# Patient Record
Sex: MALE | Race: BLACK OR AFRICAN AMERICAN | NOT HISPANIC OR LATINO | Employment: PART TIME | URBAN - METROPOLITAN AREA
[De-identification: names, ages, dates, MRNs, and addresses within clinical notes are randomized per-mention and may not be internally consistent; named-entity substitution may affect disease eponyms.]

---

## 2020-09-18 ENCOUNTER — APPOINTMENT (OUTPATIENT)
Dept: URGENT CARE | Age: 35
End: 2020-09-18
Payer: OTHER MISCELLANEOUS

## 2020-09-18 PROCEDURE — 99213 OFFICE O/P EST LOW 20 MIN: CPT | Performed by: PREVENTIVE MEDICINE

## 2021-11-03 ENCOUNTER — APPOINTMENT (EMERGENCY)
Dept: RADIOLOGY | Facility: HOSPITAL | Age: 36
End: 2021-11-03
Payer: COMMERCIAL

## 2021-11-03 ENCOUNTER — HOSPITAL ENCOUNTER (EMERGENCY)
Facility: HOSPITAL | Age: 36
Discharge: HOME | End: 2021-11-03
Attending: EMERGENCY MEDICINE
Payer: COMMERCIAL

## 2021-11-03 VITALS
SYSTOLIC BLOOD PRESSURE: 140 MMHG | RESPIRATION RATE: 18 BRPM | OXYGEN SATURATION: 96 % | DIASTOLIC BLOOD PRESSURE: 93 MMHG | TEMPERATURE: 96.8 F | HEART RATE: 78 BPM | HEIGHT: 75 IN | BODY MASS INDEX: 28.6 KG/M2 | WEIGHT: 230 LBS

## 2021-11-03 DIAGNOSIS — R05.9 COUGH: ICD-10-CM

## 2021-11-03 DIAGNOSIS — J45.31 MILD PERSISTENT ASTHMA WITH EXACERBATION: Primary | ICD-10-CM

## 2021-11-03 LAB
ANION GAP SERPL CALC-SCNC: 11 MEQ/L (ref 3–15)
BASOPHILS # BLD: 0.05 K/UL (ref 0.01–0.1)
BASOPHILS NFR BLD: 0.9 %
BUN SERPL-MCNC: 8 MG/DL (ref 8–20)
CALCIUM SERPL-MCNC: 9.6 MG/DL (ref 8.9–10.3)
CHLORIDE SERPL-SCNC: 109 MEQ/L (ref 98–109)
CO2 SERPL-SCNC: 23 MEQ/L (ref 22–32)
CREAT SERPL-MCNC: 1.2 MG/DL (ref 0.8–1.3)
DIFFERENTIAL METHOD BLD: ABNORMAL
EOSINOPHIL # BLD: 0.58 K/UL (ref 0.04–0.54)
EOSINOPHIL NFR BLD: 10.5 %
ERYTHROCYTE [DISTWIDTH] IN BLOOD BY AUTOMATED COUNT: 12.8 % (ref 11.6–14.4)
FLUAV RNA SPEC QL NAA+PROBE: NEGATIVE
FLUBV RNA SPEC QL NAA+PROBE: NEGATIVE
GFR SERPL CREATININE-BSD FRML MDRD: >60 ML/MIN/1.73M*2
GLUCOSE SERPL-MCNC: 90 MG/DL (ref 70–99)
HCT VFR BLDCO AUTO: 48.1 % (ref 40.1–51)
HGB BLD-MCNC: 15.9 G/DL (ref 13.7–17.5)
IMM GRANULOCYTES # BLD AUTO: 0.01 K/UL (ref 0–0.08)
IMM GRANULOCYTES NFR BLD AUTO: 0.2 %
LYMPHOCYTES # BLD: 2.53 K/UL (ref 1.2–3.5)
LYMPHOCYTES NFR BLD: 45.9 %
MCH RBC QN AUTO: 28.3 PG (ref 28–33.2)
MCHC RBC AUTO-ENTMCNC: 33.1 G/DL (ref 32.2–36.5)
MCV RBC AUTO: 85.6 FL (ref 83–98)
MONOCYTES # BLD: 0.45 K/UL (ref 0.3–1)
MONOCYTES NFR BLD: 8.2 %
NEUTROPHILS # BLD: 1.89 K/UL (ref 1.7–7)
NEUTS SEG NFR BLD: 34.3 %
NRBC BLD-RTO: 0 %
PDW BLD AUTO: 9.3 FL (ref 9.4–12.4)
PLATELET # BLD AUTO: 272 K/UL (ref 150–350)
POTASSIUM SERPL-SCNC: 4.4 MEQ/L (ref 3.6–5.1)
RBC # BLD AUTO: 5.62 M/UL (ref 4.5–5.8)
RSV RNA SPEC QL NAA+PROBE: NEGATIVE
SARS-COV-2 RNA RESP QL NAA+PROBE: NEGATIVE
SODIUM SERPL-SCNC: 143 MEQ/L (ref 136–144)
TROPONIN I SERPL-MCNC: <0.03 NG/ML
WBC # BLD AUTO: 5.51 K/UL (ref 3.8–10.5)

## 2021-11-03 PROCEDURE — 3E0F7GC INTRODUCTION OF OTHER THERAPEUTIC SUBSTANCE INTO RESPIRATORY TRACT, VIA NATURAL OR ARTIFICIAL OPENING: ICD-10-PCS | Performed by: EMERGENCY MEDICINE

## 2021-11-03 PROCEDURE — 99283 EMERGENCY DEPT VISIT LOW MDM: CPT | Mod: 25

## 2021-11-03 PROCEDURE — 85025 COMPLETE CBC W/AUTO DIFF WBC: CPT | Performed by: EMERGENCY MEDICINE

## 2021-11-03 PROCEDURE — 80048 BASIC METABOLIC PNL TOTAL CA: CPT | Performed by: EMERGENCY MEDICINE

## 2021-11-03 PROCEDURE — 63700000 HC SELF-ADMINISTRABLE DRUG: Performed by: EMERGENCY MEDICINE

## 2021-11-03 PROCEDURE — 84484 ASSAY OF TROPONIN QUANT: CPT | Performed by: EMERGENCY MEDICINE

## 2021-11-03 PROCEDURE — 36415 COLL VENOUS BLD VENIPUNCTURE: CPT | Performed by: EMERGENCY MEDICINE

## 2021-11-03 PROCEDURE — 87637 SARSCOV2&INF A&B&RSV AMP PRB: CPT | Performed by: EMERGENCY MEDICINE

## 2021-11-03 PROCEDURE — 25000000 HC PHARMACY GENERAL: Performed by: EMERGENCY MEDICINE

## 2021-11-03 PROCEDURE — 93005 ELECTROCARDIOGRAM TRACING: CPT

## 2021-11-03 PROCEDURE — 93005 ELECTROCARDIOGRAM TRACING: CPT | Performed by: EMERGENCY MEDICINE

## 2021-11-03 PROCEDURE — 71046 X-RAY EXAM CHEST 2 VIEWS: CPT

## 2021-11-03 RX ORDER — ALBUTEROL SULFATE 90 UG/1
2 INHALANT RESPIRATORY (INHALATION) EVERY 4 HOURS PRN
Qty: 1 EACH | Refills: 0 | Status: SHIPPED | OUTPATIENT
Start: 2021-11-03 | End: 2022-08-30 | Stop reason: SDUPTHER

## 2021-11-03 RX ORDER — PREDNISONE 20 MG/1
60 TABLET ORAL ONCE
Status: COMPLETED | OUTPATIENT
Start: 2021-11-03 | End: 2021-11-03

## 2021-11-03 RX ORDER — PREDNISONE 50 MG/1
50 TABLET ORAL DAILY
Qty: 5 TABLET | Refills: 0 | Status: SHIPPED | OUTPATIENT
Start: 2021-11-03 | End: 2021-11-08

## 2021-11-03 RX ORDER — ALBUTEROL SULFATE 0.83 MG/ML
2.5 SOLUTION RESPIRATORY (INHALATION) ONCE
Status: COMPLETED | OUTPATIENT
Start: 2021-11-03 | End: 2021-11-03

## 2021-11-03 RX ADMIN — PREDNISONE 60 MG: 20 TABLET ORAL at 20:52

## 2021-11-03 RX ADMIN — ALBUTEROL SULFATE 2.5 MG: 2.5 SOLUTION RESPIRATORY (INHALATION) at 20:51

## 2021-11-03 ASSESSMENT — ENCOUNTER SYMPTOMS
FEVER: 0
BACK PAIN: 0
SYNCOPE: 0
VOMITING: 0
NECK PAIN: 0
COUGH: 1
ABDOMINAL PAIN: 0
HEADACHES: 0
SORE THROAT: 0
DIAPHORESIS: 0
WHEEZING: 1
SHORTNESS OF BREATH: 1

## 2021-11-03 NOTE — ED PROVIDER NOTES
Emergency Medicine Note  HPI   HISTORY OF PRESENT ILLNESS       History provided by:  Patient  Shortness of Breath  Severity:  Moderate  Onset quality:  Gradual  Duration:  5 days  Timing:  Constant  Progression:  Waxing and waning  Chronicity:  New  Context comment:  Has had cough for the past 3 days or so.  Has some sinus drainage.  Relieved by:  Nothing  Worsened by:  Activity and coughing (has a new cat - possible allergy related)  Ineffective treatments:  Inhaler  Associated symptoms: cough and wheezing    Associated symptoms: no abdominal pain, no diaphoresis, no fever, no headaches, no neck pain, no rash, no sore throat, no syncope and no vomiting    Associated symptoms comment:  Chest tightness with wheezing          Patient History   PAST HISTORY     Reviewed from Nursing Triage:      History reviewed. No pertinent past medical history.    History reviewed. No pertinent surgical history.    History reviewed. No pertinent family history.    Social History     Tobacco Use   • Smoking status: Never Smoker   • Smokeless tobacco: Never Used   Substance Use Topics   • Alcohol use: Not on file   • Drug use: Not on file         Review of Systems   REVIEW OF SYSTEMS     Review of Systems   Constitutional: Negative for diaphoresis and fever.   HENT: Negative for sore throat.         Nasal congestion     Respiratory: Positive for cough, shortness of breath and wheezing.    Cardiovascular: Negative for leg swelling and syncope.   Gastrointestinal: Negative for abdominal pain and vomiting.   Musculoskeletal: Negative for back pain and neck pain.   Skin: Negative for rash.   Neurological: Negative for headaches.   All other systems reviewed and are negative.        VITALS     ED Vitals    Date/Time Temp Pulse Resp BP SpO2 [facility]   11/03/21 1845 -- 81 18 138/89 95 % [facility]   11/03/21 1749 -- 81 18 140/91 95 % [facility]   11/03/21 1612 36 °C (96.8 °F) 82 18 150/78 95 % [facility]        Pulse Ox %: 95 % (11/03/21 1906)  Pulse Ox  Interpretation: Normal (11/03/21 1906)  Heart Rate: 82 (11/03/21 1906)  Rhythm Strip Interpretation: Normal Sinus Rhythm (11/03/21 1906)     Physical Exam   PHYSICAL EXAM     Physical Exam  Vitals and nursing note reviewed.   Constitutional:       General: He is not in acute distress.     Appearance: Normal appearance. He is not ill-appearing.   HENT:      Head: Normocephalic and atraumatic.   Eyes:      Conjunctiva/sclera: Conjunctivae normal.      Pupils: Pupils are equal, round, and reactive to light.   Cardiovascular:      Rate and Rhythm: Normal rate and regular rhythm.      Pulses: Normal pulses.      Heart sounds: Normal heart sounds. No murmur heard.      Pulmonary:      Effort: Pulmonary effort is normal.      Breath sounds: Wheezing (  mild expiratory bilaterally) present.   Abdominal:      Palpations: Abdomen is soft.      Tenderness: There is no abdominal tenderness.   Musculoskeletal:         General: No tenderness.      Cervical back: Neck supple.      Right lower leg: No edema.      Left lower leg: No edema.   Skin:     General: Skin is warm and dry.      Coloration: Skin is not pale.      Findings: No erythema or rash.   Neurological:      Mental Status: He is alert and oriented to person, place, and time.   Psychiatric:         Mood and Affect: Mood normal.           PROCEDURES     Procedures     DATA     Results     None          Imaging Results          X-RAY CHEST 2 VIEWS (Final result)  Result time 11/03/21 17:36:33    Final result                 Impression:    IMPRESSION:  No dense effusion or dense consolidation.             Narrative:    CLINICAL HISTORY:   Shortness of breath.    COMMENT:  Technique: Frontal and lateral views of the chest were obtained.    Comparison: None.    The lungs are free of dense effusion or dense consolidation. The  cardiomediastinal silhouette is within normal limits. The osseous structures are  within normal limits.                                ECG 12 lead              Scoring tools                                 ED Course & MDM   MDM / ED COURSE and CLINICAL IMPRESSIONS     MDM    ED Course as of 11/03/21 2308 Wed Nov 03, 2021   1905 EKG NSR 79bpm nonspecific t changes, normal qrs/axis/st.  No STEMI.   [MURPHY]   1925 Patient had covid in April 2021 and was vaccinated over the summer.  Has hx asthma and uses inhaler PRN.  No sick contacts.  Patient states he has a very abnormal baseline EKG and they always keep him in the hospital whenever they check an ekg.  States had a negative stress test in 2018.   [MURPHY]      ED Course User Index  [MURPHY] Venkat Cantu MD         Clinical Impressions as of 11/03/21 2308   Mild persistent asthma with exacerbation   Cough            Venkat Cantu MD  11/03/21 2310

## 2021-11-04 LAB
ATRIAL RATE: 79
P AXIS: 54
PR INTERVAL: 178
QRS DURATION: 78
QT INTERVAL: 340
QTC CALCULATION(BAZETT): 389
R AXIS: 57
T WAVE AXIS: 16
VENTRICULAR RATE: 79

## 2021-11-04 PROCEDURE — 93010 ELECTROCARDIOGRAM REPORT: CPT | Performed by: INTERNAL MEDICINE

## 2021-11-04 NOTE — DISCHARGE INSTRUCTIONS
Call to follow-up with a primary care physician as we discussed.  Use your inhaler as directed for wheezing.  Take your next dose of the prednisone after dinner tomorrow.  Return to the ER for concerning symptoms.

## 2022-02-04 ENCOUNTER — HOSPITAL ENCOUNTER (EMERGENCY)
Facility: HOSPITAL | Age: 37
Discharge: HOME | End: 2022-02-04
Attending: EMERGENCY MEDICINE
Payer: COMMERCIAL

## 2022-02-04 ENCOUNTER — APPOINTMENT (EMERGENCY)
Dept: RADIOLOGY | Facility: HOSPITAL | Age: 37
End: 2022-02-04
Payer: COMMERCIAL

## 2022-02-04 VITALS
RESPIRATION RATE: 16 BRPM | OXYGEN SATURATION: 96 % | WEIGHT: 242 LBS | SYSTOLIC BLOOD PRESSURE: 138 MMHG | HEIGHT: 75 IN | DIASTOLIC BLOOD PRESSURE: 88 MMHG | BODY MASS INDEX: 30.09 KG/M2 | HEART RATE: 86 BPM | TEMPERATURE: 98.1 F

## 2022-02-04 DIAGNOSIS — J06.9 URI, ACUTE: Primary | ICD-10-CM

## 2022-02-04 DIAGNOSIS — J98.01 BRONCHOSPASM: ICD-10-CM

## 2022-02-04 LAB
FLUAV RNA SPEC QL NAA+PROBE: NEGATIVE
FLUBV RNA SPEC QL NAA+PROBE: NEGATIVE
RSV RNA SPEC QL NAA+PROBE: NEGATIVE
SARS-COV-2 RNA RESP QL NAA+PROBE: NEGATIVE

## 2022-02-04 PROCEDURE — 63700000 HC SELF-ADMINISTRABLE DRUG: Performed by: EMERGENCY MEDICINE

## 2022-02-04 PROCEDURE — 71046 X-RAY EXAM CHEST 2 VIEWS: CPT

## 2022-02-04 PROCEDURE — 25000000 HC PHARMACY GENERAL: Performed by: EMERGENCY MEDICINE

## 2022-02-04 PROCEDURE — 99283 EMERGENCY DEPT VISIT LOW MDM: CPT | Mod: 25

## 2022-02-04 PROCEDURE — 87637 SARSCOV2&INF A&B&RSV AMP PRB: CPT | Performed by: EMERGENCY MEDICINE

## 2022-02-04 RX ORDER — ALBUTEROL SULFATE 90 UG/1
2 INHALANT RESPIRATORY (INHALATION) EVERY 4 HOURS PRN
Qty: 1 EACH | Refills: 0 | Status: SHIPPED | OUTPATIENT
Start: 2022-02-04 | End: 2022-08-30 | Stop reason: SDUPTHER

## 2022-02-04 RX ORDER — IPRATROPIUM BROMIDE AND ALBUTEROL SULFATE 2.5; .5 MG/3ML; MG/3ML
3 SOLUTION RESPIRATORY (INHALATION) ONCE
Status: COMPLETED | OUTPATIENT
Start: 2022-02-04 | End: 2022-02-04

## 2022-02-04 RX ORDER — BENZONATATE 100 MG/1
100 CAPSULE ORAL
Qty: 21 CAPSULE | Refills: 0 | Status: SHIPPED | OUTPATIENT
Start: 2022-02-04 | End: 2022-02-14

## 2022-02-04 RX ORDER — PREDNISONE 50 MG/1
50 TABLET ORAL DAILY
Qty: 5 TABLET | Refills: 0 | Status: SHIPPED | OUTPATIENT
Start: 2022-02-04 | End: 2022-02-09

## 2022-02-04 RX ORDER — PREDNISONE 20 MG/1
60 TABLET ORAL ONCE
Status: COMPLETED | OUTPATIENT
Start: 2022-02-04 | End: 2022-02-04

## 2022-02-04 RX ADMIN — IPRATROPIUM BROMIDE AND ALBUTEROL SULFATE 3 ML: .5; 3 SOLUTION RESPIRATORY (INHALATION) at 16:40

## 2022-02-04 RX ADMIN — PREDNISONE 60 MG: 20 TABLET ORAL at 16:40

## 2022-02-04 ASSESSMENT — ENCOUNTER SYMPTOMS
WEAKNESS: 0
FEVER: 0
VOMITING: 0
COUGH: 1
WOUND: 0
HEADACHES: 0
SORE THROAT: 1
FLANK PAIN: 0
BACK PAIN: 0

## 2022-02-04 NOTE — ED PROVIDER NOTES
Emergency Medicine Note  HPI   HISTORY OF PRESENT ILLNESS     Patient is a 36-year-old male history of asthma as a child, COVID-19 infection of April 2021, vaccinated, presents with 1 month of a cough and intermittent wheezing.  He states during this time he did have a negative Covid test.  His cough is worse at night and is productive of some yellow, and sometimes clear sputum.  He has had sneezing, congestion and feels out of breath.  He uses albuterol inhaler and gets relief.  Symptoms have been persistent.  He states he has had similar episodes of wheezing since his COVID-19 infection of April 2021.      History provided by:  Patient  URI  Presenting symptoms: congestion, cough and sore throat (At first but resolved)    Presenting symptoms: no fever    Severity:  Severe  Duration:  1 month  Timing:  Intermittent  Progression:  Waxing and waning  Chronicity:  Recurrent  Relieved by:  Nothing  Worsened by:  Nothing  Associated symptoms: sneezing    Associated symptoms: no headaches    Chest Pain  Associated symptoms: cough    Associated symptoms: no back pain, no fever, no headache, no vomiting and no weakness          Patient History   PAST HISTORY     Reviewed from Nursing Triage:       No past medical history on file.    No past surgical history on file.    No family history on file.    Social History     Tobacco Use   • Smoking status: Never Smoker   • Smokeless tobacco: Never Used   Substance Use Topics   • Alcohol use: Not on file   • Drug use: Not on file         Review of Systems   REVIEW OF SYSTEMS     Review of Systems   Constitutional: Negative for fever.   HENT: Positive for congestion, sneezing and sore throat (At first but resolved).    Respiratory: Positive for cough.    Cardiovascular: Positive for chest pain.   Gastrointestinal: Negative for vomiting.   Genitourinary: Negative for flank pain.   Musculoskeletal: Negative for back pain.   Skin: Negative for wound.   Neurological: Negative for  weakness and headaches.   Psychiatric/Behavioral: Negative for suicidal ideas.         VITALS     ED Vitals    Date/Time Temp Pulse Resp BP SpO2 [facility]   02/04/22 1559 36.7 °C (98.1 °F) 90 18 139/94 95 % [facility]        Pulse Ox %: 95 % (02/04/22 1613)  Pulse Ox Interpretation: Normal (02/04/22 1613)  Heart Rate: 90 (02/04/22 1613)        Physical Exam   PHYSICAL EXAM     Physical Exam  Constitutional:       Appearance: Normal appearance.   HENT:      Head: Normocephalic.   Eyes:      General: No scleral icterus.     Conjunctiva/sclera: Conjunctivae normal.   Cardiovascular:      Rate and Rhythm: Normal rate.      Pulses: Normal pulses.   Pulmonary:      Effort: No respiratory distress.      Breath sounds: Wheezing (Faint expiratory) present.   Abdominal:      Tenderness: There is no abdominal tenderness.   Musculoskeletal:         General: No tenderness.      Cervical back: Normal range of motion.      Right lower leg: No edema.      Left lower leg: No edema.   Skin:     General: Skin is warm and dry.   Neurological:      General: No focal deficit present.      Mental Status: He is alert and oriented to person, place, and time. Mental status is at baseline.   Psychiatric:         Mood and Affect: Mood normal.         Behavior: Behavior normal.         Thought Content: Thought content normal.           PROCEDURES     Procedures     DATA     Results     None          Imaging Results          X-RAY CHEST 2 VIEWS (Final result)  Result time 02/04/22 16:28:51    Final result                 Impression:    IMPRESSION:  No radiographic evidence of acute cardiopulmonary disease.               Narrative:      CLINICAL HISTORY: Shortness of breath.    COMMENT:  PA and lateral views of the chest were obtained.    Comparison: 11/3/2021    There is mild elevation of the right hemidiaphragm. There is no focal  consolidation or pleural effusion identified. The cardiac and mediastinal  silhouettes are unremarkable.  No acute abnormality  is identified in the  regional osseous structures.                                No orders to display       Scoring tools                                 ED Course & MDM   MDM / ED COURSE / CLINICAL IMPRESSIONS / DISPO     MDM    ED Course as of 02/04/22 1946 Fri Feb 04, 2022   1636 Patient is stable.  No shortness.  Is a multiple cough, pain with coughing, congestion or URI symptoms.  He does have a slight expiratory wheeze.  Will give steroids and albuterol and reassess.  His x-ray is clear without evidence of heart failure or pneumonia.  He is PERC negative. [DP]   1808 Covid and flu tests are negative.  Patient remained stable.  His lungs are clear after steroids and albuterol.  Will discharge on prednisone, albuterol, and Tessalon.. [DP]      ED Course User Index  [DP] Abdon Esparza MD         Clinical Impressions as of 02/04/22 1946   URI, acute   Bronchospasm              Abdon Esparza MD  02/04/22 1946

## 2022-08-30 ENCOUNTER — APPOINTMENT (EMERGENCY)
Dept: RADIOLOGY | Facility: HOSPITAL | Age: 37
End: 2022-08-30
Attending: EMERGENCY MEDICINE
Payer: COMMERCIAL

## 2022-08-30 ENCOUNTER — HOSPITAL ENCOUNTER (EMERGENCY)
Facility: HOSPITAL | Age: 37
Discharge: HOME | End: 2022-08-30
Attending: EMERGENCY MEDICINE
Payer: COMMERCIAL

## 2022-08-30 VITALS
BODY MASS INDEX: 29.84 KG/M2 | DIASTOLIC BLOOD PRESSURE: 83 MMHG | TEMPERATURE: 98.1 F | RESPIRATION RATE: 18 BRPM | WEIGHT: 240 LBS | OXYGEN SATURATION: 99 % | HEART RATE: 93 BPM | HEIGHT: 75 IN | SYSTOLIC BLOOD PRESSURE: 131 MMHG

## 2022-08-30 DIAGNOSIS — J20.9 ACUTE BRONCHITIS, UNSPECIFIED ORGANISM: Primary | ICD-10-CM

## 2022-08-30 PROCEDURE — 87637 SARSCOV2&INF A&B&RSV AMP PRB: CPT | Performed by: EMERGENCY MEDICINE

## 2022-08-30 PROCEDURE — 71045 X-RAY EXAM CHEST 1 VIEW: CPT

## 2022-08-30 PROCEDURE — 99283 EMERGENCY DEPT VISIT LOW MDM: CPT

## 2022-08-30 RX ORDER — ALBUTEROL SULFATE 90 UG/1
2 INHALANT RESPIRATORY (INHALATION) EVERY 4 HOURS PRN
Qty: 1 EACH | Refills: 0 | Status: SHIPPED | OUTPATIENT
Start: 2022-08-30 | End: 2024-05-20

## 2022-08-30 RX ORDER — ALBUTEROL SULFATE 90 UG/1
2 INHALANT RESPIRATORY (INHALATION) EVERY 4 HOURS PRN
Qty: 1 EACH | Refills: 1 | Status: SHIPPED | OUTPATIENT
Start: 2022-08-30 | End: 2022-09-29

## 2022-08-30 ASSESSMENT — ENCOUNTER SYMPTOMS
SORE THROAT: 0
PALPITATIONS: 0
CARDIOVASCULAR NEGATIVE: 1
ENDOCRINE NEGATIVE: 1
COUGH: 1
CHEST TIGHTNESS: 0
FLANK PAIN: 0
MUSCULOSKELETAL NEGATIVE: 1
BRUISES/BLEEDS EASILY: 0
CONSTITUTIONAL NEGATIVE: 1
HEADACHES: 0
NECK STIFFNESS: 0
EYES NEGATIVE: 1
WEAKNESS: 0
HEMATOLOGIC/LYMPHATIC NEGATIVE: 1
ALLERGIC/IMMUNOLOGIC NEGATIVE: 1
DYSURIA: 0
NEUROLOGICAL NEGATIVE: 1
FACIAL SWELLING: 0
ABDOMINAL DISTENTION: 0
LIGHT-HEADEDNESS: 0
DIARRHEA: 0
ARTHRALGIAS: 0
SHORTNESS OF BREATH: 0
RHINORRHEA: 0
NUMBNESS: 0
ACTIVITY CHANGE: 0
GASTROINTESTINAL NEGATIVE: 1

## 2022-08-30 NOTE — ED PROVIDER NOTES
Emergency Medicine Note  HPI   HISTORY OF PRESENT ILLNESS     36-year-old -American male here with a cough since yesterday which she says is dry.  Also has had a low-grade temp he says about 101 degrees.  Not had any shaking chills but has felt cold.  Has a little muscle pain in the arms.  And has also decreased appetite.  Denies any sore throat or runny nose.  No abdominal pain nausea vomiting or diarrhea.  Says that he was vaccinated for COVID last year and has not been boosted.  Does not know of any exposures he may have had to COVID.            Patient History   PAST HISTORY     Reviewed from Nursing Triage:         No past medical history on file.    No past surgical history on file.    No family history on file.    Social History     Tobacco Use    Smoking status: Never Smoker    Smokeless tobacco: Never Used         Review of Systems   REVIEW OF SYSTEMS     Review of Systems   Constitutional: Negative.  Negative for activity change.   HENT: Positive for congestion. Negative for facial swelling, nosebleeds, rhinorrhea and sore throat.    Eyes: Negative.  Negative for visual disturbance.   Respiratory: Positive for cough. Negative for chest tightness and shortness of breath.         Cough is dry.   Cardiovascular: Negative.  Negative for chest pain and palpitations.   Gastrointestinal: Negative.  Negative for abdominal distention and diarrhea.   Endocrine: Negative.  Negative for polyuria.   Genitourinary: Negative.  Negative for decreased urine volume, dysuria, enuresis and flank pain.   Musculoskeletal: Negative.  Negative for arthralgias and neck stiffness.   Skin: Negative for rash.   Allergic/Immunologic: Negative.  Negative for immunocompromised state.   Neurological: Negative.  Negative for weakness, light-headedness, numbness and headaches.   Hematological: Negative.  Does not bruise/bleed easily.         VITALS     ED Vitals    Date/Time Temp Pulse Resp BP SpO2 [facility]   08/30/22 1310 36.7 °C  (98.1 °F) 102 20 127/78 93 % [facility]                       Physical Exam   PHYSICAL EXAM     Physical Exam  Vitals and nursing note reviewed.   Constitutional:       Appearance: Normal appearance. He is normal weight.   HENT:      Head: Normocephalic and atraumatic.      Mouth/Throat:      Mouth: Mucous membranes are moist.      Pharynx: Oropharynx is clear.   Eyes:      Extraocular Movements: Extraocular movements intact.      Conjunctiva/sclera: Conjunctivae normal.      Pupils: Pupils are equal, round, and reactive to light.   Cardiovascular:      Rate and Rhythm: Normal rate and regular rhythm.      Pulses: Normal pulses.      Heart sounds: Normal heart sounds. No murmur heard.  Pulmonary:      Effort: Pulmonary effort is normal. No respiratory distress.      Breath sounds: Normal breath sounds. No wheezing.   Abdominal:      General: Abdomen is flat. Bowel sounds are normal.      Palpations: Abdomen is soft.      Tenderness: There is no abdominal tenderness.   Musculoskeletal:         General: No swelling or tenderness. Normal range of motion.      Cervical back: Normal range of motion and neck supple.   Skin:     General: Skin is warm and dry.      Capillary Refill: Capillary refill takes less than 2 seconds.      Coloration: Skin is not jaundiced or pale.   Neurological:      General: No focal deficit present.      Mental Status: He is alert and oriented to person, place, and time. Mental status is at baseline.           PROCEDURES     Procedures     DATA     Results     None          Imaging Results    None         No orders to display       Scoring tools                                  ED Course & MDM   MDM / ED COURSE / CLINICAL IMPRESSION / DISPO     MDM    ED Course as of 08/30/22 1648   Tue Aug 30, 2022   1648 COVID and RSV and influenza are all negative.  Chest XR is negative.  Symptoms most consistent with an acute bronchitis which is most likely a viral illness.  We will go ahead and discharge. [MS]       ED Course User Index  [MS] Everett Pearson MD     Clinical Impression      None     Disposition           Everett Pearson MD  08/30/22 4877

## 2022-08-30 NOTE — DISCHARGE INSTRUCTIONS
Your COVID test and your x-ray were both negative.  Your symptoms are most consistent with a viral illness.  Be sure to rest, drink lots of fluids and take ibuprofen or Tylenol as needed for your discomfort.  You can also take over-the-counter cough medicine if the cough is severe.  You can use the albuterol at home as well for the cough.

## 2022-08-30 NOTE — LETTER
August 30, 2022    Patient: Minor Petersen   YOB: 1985   Date of Visit: 8/30/2022       To Whom It May Concern:    Minor Petersen was seen and treated in our emergency department on 8/30/2022. He may retunr to work on 9/1/2022.    If you have any questions or concerns, please don't hesitate to call.

## 2023-01-07 ENCOUNTER — APPOINTMENT (EMERGENCY)
Dept: RADIOLOGY | Facility: HOSPITAL | Age: 38
End: 2023-01-07
Payer: COMMERCIAL

## 2023-01-07 ENCOUNTER — HOSPITAL ENCOUNTER (EMERGENCY)
Facility: HOSPITAL | Age: 38
Discharge: HOME | End: 2023-01-07
Attending: EMERGENCY MEDICINE | Admitting: EMERGENCY MEDICINE
Payer: COMMERCIAL

## 2023-01-07 VITALS
BODY MASS INDEX: 31.71 KG/M2 | RESPIRATION RATE: 16 BRPM | OXYGEN SATURATION: 98 % | HEART RATE: 97 BPM | WEIGHT: 255 LBS | SYSTOLIC BLOOD PRESSURE: 126 MMHG | TEMPERATURE: 97 F | DIASTOLIC BLOOD PRESSURE: 72 MMHG | HEIGHT: 75 IN

## 2023-01-07 DIAGNOSIS — J06.9 VIRAL URI: Primary | ICD-10-CM

## 2023-01-07 PROCEDURE — 99283 EMERGENCY DEPT VISIT LOW MDM: CPT

## 2023-01-07 PROCEDURE — 71045 X-RAY EXAM CHEST 1 VIEW: CPT

## 2023-01-07 PROCEDURE — 63700000 HC SELF-ADMINISTRABLE DRUG: Performed by: PHYSICIAN ASSISTANT

## 2023-01-07 PROCEDURE — 87637 SARSCOV2&INF A&B&RSV AMP PRB: CPT

## 2023-01-07 PROCEDURE — 87637 SARSCOV2&INF A&B&RSV AMP PRB: CPT | Performed by: EMERGENCY MEDICINE

## 2023-01-07 RX ORDER — BENZONATATE 100 MG/1
100 CAPSULE ORAL
Qty: 21 CAPSULE | Refills: 0 | Status: SHIPPED | OUTPATIENT
Start: 2023-01-07 | End: 2023-01-07 | Stop reason: SDUPTHER

## 2023-01-07 RX ORDER — IBUPROFEN 600 MG/1
600 TABLET ORAL ONCE
Status: COMPLETED | OUTPATIENT
Start: 2023-01-07 | End: 2023-01-07

## 2023-01-07 RX ORDER — BENZONATATE 100 MG/1
200 CAPSULE ORAL ONCE
Status: COMPLETED | OUTPATIENT
Start: 2023-01-07 | End: 2023-01-07

## 2023-01-07 RX ORDER — BENZONATATE 100 MG/1
100 CAPSULE ORAL
Qty: 21 CAPSULE | Refills: 0 | Status: SHIPPED | OUTPATIENT
Start: 2023-01-07 | End: 2023-01-17

## 2023-01-07 RX ADMIN — IBUPROFEN 600 MG: 600 TABLET, FILM COATED ORAL at 18:48

## 2023-01-07 RX ADMIN — BENZONATATE 200 MG: 100 CAPSULE ORAL at 18:48

## 2023-01-07 ASSESSMENT — ENCOUNTER SYMPTOMS
SINUS PRESSURE: 1
RESPIRATORY NEGATIVE: 1
HEADACHES: 1
APPETITE CHANGE: 1
CARDIOVASCULAR NEGATIVE: 1
PSYCHIATRIC NEGATIVE: 1
MUSCULOSKELETAL NEGATIVE: 1
GASTROINTESTINAL NEGATIVE: 1

## 2023-01-07 NOTE — DISCHARGE INSTRUCTIONS
Please review all discharge instructions as discussed.  Please be re-evaluated by your primary doctor as soon as possible and be sure to review all laboratory, radiology and other testing with your doctor.  Should your symptoms worsen or not improve, return to emergency room immediately.  Please use Tylenol ibuprofen to alleviate development of fever.  Please return to the ER if any worsening of symptoms.

## 2023-09-23 ENCOUNTER — HOSPITAL ENCOUNTER (EMERGENCY)
Facility: HOSPITAL | Age: 38
Discharge: HOME | End: 2023-09-24
Attending: EMERGENCY MEDICINE | Admitting: EMERGENCY MEDICINE
Payer: COMMERCIAL

## 2023-09-23 DIAGNOSIS — J02.9 SORE THROAT: ICD-10-CM

## 2023-09-23 DIAGNOSIS — R09.81 NASAL CONGESTION: Primary | ICD-10-CM

## 2023-09-23 DIAGNOSIS — I83.93 VARICOSE VEINS OF BOTH LOWER EXTREMITIES, UNSPECIFIED WHETHER COMPLICATED: ICD-10-CM

## 2023-09-23 PROCEDURE — 87637 SARSCOV2&INF A&B&RSV AMP PRB: CPT | Performed by: EMERGENCY MEDICINE

## 2023-09-23 PROCEDURE — 99283 EMERGENCY DEPT VISIT LOW MDM: CPT

## 2023-09-23 NOTE — LETTER
September 24, 2023    Patient: Minor Petersen   YOB: 1985   Date of Visit: 9/23/2023       To Whom It May Concern:    Minor Petersen was seen and treated in our emergency department on 9/23/2023. He may return to work on 9/25/23. If you have any questions or concerns, please don't hesitate to call [phone].          Michael Dugan PA-C

## 2023-09-23 NOTE — LETTER
Patient: Minor Petersen       YOB: 1985       Date: 9/24/2023    Time: 1:32 AM    Against Medical Advice Discharge/Refusal of Treatment -   Release from Responsibility - Refusal of Treatment    This section is to be completed if the PATIENT is to sign.    This will certify that I, the above named patient, am refusing treatment for (indicate treatment being refused)_________________________________________________ against the advice of my attending physician, Doctor ________________.    I acknowledge that I have been fully informed of the medical and related risks involved in my refusal, including, but not limited to __________________________________________________________________________  ________________________________________________________________________________________and have had sufficient time to consider, and have considered, such risks in deciding to refuse treatment against my physician's advice.  I hereby release my physician, any other physicians or hospital personnel who have treated me, and the hospital from any and all responsibility or liability for adverse effects of any nature with respect to my medical condition which may result in any manner from my refusal of treatment.    _________________________________                         ________________________AM   PM  Signature of Patient                                                           Date                              Time     _________________________________                         ________________________AM   PM  Signature of Witness to Signature Only                             Date                              Time     _________________________________                         ________________________AM   PM  Signature of Physician                                                       Date                              Time     This section is to be completed if the PATIENT'S SURROGATE DECISION MAKER/AUTHORIZED  REPRESENTATIVE IS TO SIGN.    This is to certify that I, the undersigned, am refusing treatment of the above named patient for (indicate treatment being refused) ______________________ against the advice of the attending physician,   Doctor ____________________.  I acknowledge that I have been fully informed of the medical and related risks involved in this refusal of treatment, including, but not limited to________________________________  _______________________________________________________________________________________  and have had sufficient time to consider, and have considered, such risks in deciding to authorize the refusal of treatment of the above named patient against the attending physician's advice.  I hereby release the physician, other physician or hospital personnel who have treated the above named patient, and the hospital from any and all responsibility or liability for adverse effects of any nature with respect to the above named patient's medical condition which may result in any manner from this refusal of treatment.    _________________________________                         ________________________AM   PM  Signature of Authorized Representative                            Date                              Time     _________________________________  Relationship to Patient    _________________________________                         ________________________AM   PM  Signature of Witness to Signature Only                             Date                              Time       _________________________________                         ________________________[facility]  Signature of Physician                                                       Date                              Time

## 2023-09-24 VITALS
HEIGHT: 75 IN | RESPIRATION RATE: 18 BRPM | OXYGEN SATURATION: 97 % | TEMPERATURE: 98.6 F | SYSTOLIC BLOOD PRESSURE: 138 MMHG | DIASTOLIC BLOOD PRESSURE: 80 MMHG | BODY MASS INDEX: 31.08 KG/M2 | WEIGHT: 250 LBS | HEART RATE: 88 BPM

## 2023-09-24 PROCEDURE — 63600000 HC DRUGS/DETAIL CODE: Performed by: PHYSICIAN ASSISTANT

## 2023-09-24 RX ORDER — IBUPROFEN 600 MG/1
600 TABLET ORAL EVERY 8 HOURS PRN
Qty: 25 TABLET | Refills: 0 | Status: SHIPPED | OUTPATIENT
Start: 2023-09-24 | End: 2023-09-24 | Stop reason: SDUPTHER

## 2023-09-24 RX ORDER — OXYMETAZOLINE HCL 0.05 %
2 SPRAY, NON-AEROSOL (ML) NASAL ONCE
Status: DISCONTINUED | OUTPATIENT
Start: 2023-09-24 | End: 2023-09-24 | Stop reason: HOSPADM

## 2023-09-24 RX ORDER — IBUPROFEN 600 MG/1
600 TABLET ORAL EVERY 8 HOURS PRN
Qty: 25 TABLET | Refills: 0 | Status: SHIPPED | OUTPATIENT
Start: 2023-09-24

## 2023-09-24 RX ORDER — FLUTICASONE PROPIONATE 50 MCG
1 SPRAY, SUSPENSION (ML) NASAL DAILY
Qty: 16 G | Refills: 0 | Status: SHIPPED | OUTPATIENT
Start: 2023-09-24

## 2023-09-24 RX ORDER — DEXAMETHASONE 4 MG/1
10 TABLET ORAL ONCE
Status: COMPLETED | OUTPATIENT
Start: 2023-09-24 | End: 2023-09-24

## 2023-09-24 RX ORDER — FLUTICASONE PROPIONATE 50 MCG
1 SPRAY, SUSPENSION (ML) NASAL DAILY
Qty: 16 G | Refills: 0 | Status: SHIPPED | OUTPATIENT
Start: 2023-09-24 | End: 2023-09-24 | Stop reason: SDUPTHER

## 2023-09-24 RX ADMIN — DEXAMETHASONE 10 MG: 4 TABLET ORAL at 00:54

## 2023-09-24 NOTE — ED PROVIDER NOTES
Emergency Medicine Note  HPI   HISTORY OF PRESENT ILLNESS     This is a 37-year-old man who says that he has a history of frequent sinus infections presenting to the ER complaining of nasal congestion sore throat and a mild dry cough for the past week.  He has not tried any medications or therapies to alleviate his symptoms.  Additionally he has some sore spots on his legs near his knees on the posterior surface.  Denies any trauma to these areas.  He notes that he stands frequently.  He has not had fever or chills and has no other complaints or concerns.            Patient History   PAST HISTORY     Reviewed from Nursing Triage:       No past medical history on file.    No past surgical history on file.    No family history on file.    Social History     Tobacco Use    Smoking status: Never    Smokeless tobacco: Never         Review of Systems   REVIEW OF SYSTEMS     Review of Systems   Constitutional: Negative for chills, fatigue and fever.   HENT: Positive for congestion, ear pain, postnasal drip, rhinorrhea, sneezing and sore throat. Negative for ear discharge, nosebleeds, trouble swallowing and voice change.    Eyes: Negative for visual disturbance.   Respiratory: Negative for shortness of breath.    Cardiovascular: Negative for chest pain.   Gastrointestinal: Negative for abdominal pain, nausea and vomiting.   Genitourinary: Negative for dysuria, frequency, hematuria and urgency.   Musculoskeletal: Negative for back pain and neck stiffness.   Skin: Negative for rash.   Neurological: Negative for headaches.         VITALS     ED Vitals    Date/Time Temp Pulse Resp BP SpO2 [facility]   09/24/23 0134 37 °C (98.6 °F) 88 18 138/80 97 % SVC   09/23/23 2253 37 °C (98.6 °F) -- -- -- -- HMT   09/23/23 2253 37.3 °C (99.1 °F) 91 18 144/84 96 % HMT                       Physical Exam   PHYSICAL EXAM     Physical Exam  Vitals and nursing note reviewed.   Constitutional:       Appearance: He is well-developed.   HENT:      Head:  Normocephalic and atraumatic.      Comments: EARS: Right ear canal is widely patent with gray tympanic membrane and normal landmarks.  Left ear canal is widely patent with a gray tympanic membrane with normal landmarks  SINUSES: No tenderness in maxillary sinuses no tenderness of frontal sinus.  NOSE : Clear rhinorrhea, patent nares  THROAT : Pharyngeal erythema without any tonsillar exudates. There is no PTA. There is no asymmetry.  No trismus, no drooling.   NECK : No cervical lymphadenopathy.  Neck is supple. No meningismus, no nuchal rigidity.   Eyes:      Conjunctiva/sclera: Conjunctivae normal.   Cardiovascular:      Rate and Rhythm: Normal rate and regular rhythm.   Pulmonary:      Effort: Pulmonary effort is normal.      Breath sounds: Normal breath sounds.   Abdominal:      General: There is no distension.      Palpations: Abdomen is soft. There is no mass.      Tenderness: There is no abdominal tenderness.   Musculoskeletal:         General: No tenderness or deformity. Normal range of motion.      Cervical back: Normal range of motion.   Skin:     General: Skin is warm and dry.      Comments: Bilateral legs: There are varicose veins correspond with patient's complaint of tenderness in these areas.  No evidence of acute inflammation or thrombophlebitis.  Normal distal neurovascular function both lower extremities.   Neurological:      Mental Status: He is alert. Mental status is at baseline.   Psychiatric:         Behavior: Behavior normal.           PROCEDURES     Procedures     DATA     Results     Procedure Component Value Units Date/Time    SARS-CoV-2 (COVID-19), PCR Nasopharynx [[id number]]  (Normal) Collected: 09/23/23 2304    Specimen: Nasopharyngeal Swab from Nasopharynx Updated: 09/24/23 0056    Narrative:      The following orders were created for panel order SARS-CoV-2 (COVID-19), PCR Nasopharynx.  Procedure                               Abnormality         Status                     ---------                                -----------         ------                     SARS-COV-2 (COVID-19)/ F...[[id number]]  Normal              Final result                 Please view results for these tests on the individual orders.    SARS-COV-2 (COVID-19)/ FLU A/B, AND RSV, PCR Nasopharynx [[id number]]  (Normal) Collected: 09/23/23 2304    Specimen: Nasopharyngeal Swab from Nasopharynx Updated: 09/24/23 0056     SARS-CoV-2 (COVID-19) Negative     Influenza A Negative     Influenza B Negative     Respiratory Syncytial Virus Negative    Narrative:      Testing performed using real-time PCR for detection of COVID-19. EUA approved validation studies performed on site.           Imaging Results    None         No orders to display       Scoring tools                                  ED Course & MDM   MDM / ED COURSE / CLINICAL IMPRESSION / DISPO     Medical Decision Making  Patient presents with symptoms strongly consistent with viral upper respiratory infection without evidence of acute bacterial illness.  Discussed with patient I recommend supportive therapy such as Flonase and decongestants.  Additionally, he has varicose veins in his legs that do not appear acutely inflamed or infected.  Will recommend vascular surgery follow-up.    Nasal congestion: acute illness or injury  Sore throat: acute illness or injury  Varicose veins of both lower extremities, unspecified whether complicated: acute illness or injury  Risk  OTC drugs.  Prescription drug management.             Clinical Impression      Nasal congestion   Sore throat   Varicose veins of both lower extremities, unspecified whether complicated     _________________     ED Disposition   Discharge                   Michael Dugan PA C  09/25/23 0612

## 2023-09-25 ASSESSMENT — ENCOUNTER SYMPTOMS
FEVER: 0
NECK STIFFNESS: 0
BACK PAIN: 0
HEMATURIA: 0
ABDOMINAL PAIN: 0
FREQUENCY: 0
SORE THROAT: 1
VOMITING: 0
VOICE CHANGE: 0
SHORTNESS OF BREATH: 0
FATIGUE: 0
HEADACHES: 0
CHILLS: 0
RHINORRHEA: 1
DYSURIA: 0
TROUBLE SWALLOWING: 0
NAUSEA: 0

## 2023-09-25 NOTE — ED ATTESTATION NOTE
The patient was evaluated and managed by the physician assistant under my supervision.   I agree with the PA's assessment and plan.      Venkat Cantu MD  09/25/23 6524

## 2023-12-19 ENCOUNTER — OFFICE VISIT (OUTPATIENT)
Dept: VASCULAR SURGERY | Facility: CLINIC | Age: 38
End: 2023-12-19
Payer: COMMERCIAL

## 2023-12-19 VITALS — SYSTOLIC BLOOD PRESSURE: 137 MMHG | DIASTOLIC BLOOD PRESSURE: 82 MMHG

## 2023-12-19 DIAGNOSIS — I87.2 VENOUS INSUFFICIENCY: Primary | ICD-10-CM

## 2023-12-19 PROCEDURE — 99204 OFFICE O/P NEW MOD 45 MIN: CPT

## 2023-12-19 NOTE — PROGRESS NOTES
Venous Consultation Note    Visit Date: 12/19/2023    Chief Complaint: New Patient  (NP: VV, throbbing and tightness BLE, pain in the back of legs )     Subjective   Minor Petersen is a 38 y.o. old male meeting with us today for evaluation of his lower extremities.  Patient works as a , has been working for the last 10 years and stands for prolonged hours.  Patient states over the last 5 years, he has had increased throbbing pain and itchiness to his lower extremities, left worse than right.  He states after a rigorous workout, he will report heaviness to his lower extremities.  He has varicose veins noted to the left medial thigh and behind the left knee.  He has tried wearing compression socks however not consistently.  He denies history of DVT or PE.  He is a non-smoker and nondiabetic.  He has not had vein procedures before.  He does not presently have any wounds.  Past medical history of hernia repair in 2015.  He has palpable distal pulses bilaterally.    Venous history:   Prior DVT: no  Prior phlebitis:  None  Bleeding/clotting disorder: None  Prior Pulmonary Embolus: None  Venous Stasis dermatitis or ulcer:  No  Prior Spider/Reticular Treatment:  No  Analgesics: OTC NSAID pain medication use PRN.  Previous conservative measures tried:   Patient has continued to wear compression stockings 20-30 mmHg on a daily basis. Patient has also for the past 6 months tried leg elevation, increase in activity, and pedal pump exercises.   Family Venous history: Mother  CEAP Classification: C 3                Medical History:No past medical history on file.    Surgical History: No past surgical history on file.    Social History:   Social History     Socioeconomic History   • Marital status: Single     Spouse name: None   • Number of children: None   • Years of education: None   • Highest education level: None   Tobacco Use   • Smoking status: Never   • Smokeless tobacco: Never     Social Determinants of  Health     Food Insecurity: No Food Insecurity (1/7/2023)    Hunger Vital Sign    • Worried About Running Out of Food in the Last Year: Never true    • Ran Out of Food in the Last Year: Never true     Social History     Tobacco Use   Smoking Status Never   Smokeless Tobacco Never       Family History: No family history on file.    Allergies: Patient has no known allergies.    Home Medications:  Not in a hospital admission.    Current Medications:  •  albuterol HFA  •  albuterol HFA  •  benzocaine-menthoL  •  fluticasone propionate  •  ibuprofen  •  predniSONE  •  predniSONE    Review of Systems  Constitutional: No fever, no chills, and no recent weight change. No headache.  HEENT: No neck pain, no neck stiffness, and no lump or swelling in the neck. no hoarseness, no dysphagia.   Cardiovascular: No chest pain or discomfort, no palpitations.  Respiratory: No wheezing. No shortness of breath, no cough, no dyspnea.  Gastrointestinal: Appetite without significant change. No dysphagia, no active abdominal pain, and no melena. No bright red blood per rectum.  Genitourinary: No hematuria, No genital lesion.  No obvious bladder changes.   Endocrine: No polydipsia and no excessive sweating.  Hematologic: No easy bleeding and no tendency for easy bruising.   Integumentary: No obvious masses, No pruritus. No skin lesions and no rash  Musculoskeletal: No new muscle tenderness.  Neurological: No new motor disturbances, or sensory disturbances.   Psychological: Appropriate.Objective     Physical Exam    Vitals:   Visit Vitals  /82       Constitutional: alert, appears stated age and cooperative  HEENT: normocephalic, without obvious abnormality, atraumatic, Neck is supple,  symmetrical, trachea midline  Musculoskeletal: symmetric, no curvature. ROM normal. No CVA tenderness.  clear to auscultation bilaterally  Cardiovascular: No chest pain or discomfort, no palpitations. No cold feet, or claudication.  Respiratory: Clear to  auscultation bilaterally,chest expansion symmetric,  eupnea, no adventitious sounds (rales, crackles, wheezes) no wheezing, no rhonki  Gastrointestinal: soft, non-tender; bowel sounds normal; no masses, no organomegaly  Extremities: extremities warm, no cyanosis, clubbing, or edema  Pulses: 2+ and symmetric  Integumentary: Skin color, texture, turgor normal. No rashes or lesions  Neurologic: No confusion was observed. Oriented to time,place and person. No disorientation was observed. Normal speech, motor,balance, and gait and stance.   Venous exam:  No palpable cords, no calf or lower extremity muscular tenderness (tiffani sign negative).  no active venous ulceration. yes Edema trace BLE, pain, aching and edema, spider veins on the left greater than right  lower extremity edema on the bilateral  the veins are painful -- severity: moderate  pain is aggravated by upright posture      Labs  No new labs.    Imaging  No recent imaging performed    Photos          Assessment/Plan     Problem List Items Addressed This Visit        Circulatory    Venous insufficiency - Primary    Current Assessment & Plan     Patient is a 38-year-old male who presents the office for evaluation of his lower extremities.  Patient works as a , has been working for the last 10 years and stands for prolonged hours.  Patient states over the last 5 years, he has had increased throbbing pain and itchiness to his lower extremities, left worse than right.  He states after a rigorous workout, he will report heaviness to his lower extremities.  He has varicose veins noted to the left medial thigh and behind the left knee.  He has tried wearing compression socks however not consistently.  He denies history of DVT or PE.  He is a non-smoker and nondiabetic.  He has not had vein procedures before.  He does not presently have any wounds.  Patient to have a formal venous reflux study to assess his superficial and deep venous system for  reflux. Patient will continue using compression stockings at this time, patient encouraged to be measured for a pair of knee-high medical grade compression stockings 20 to 30 mmHg.. Patient will return to the office after having the study for evaluation to determine if there are any treatment options needed.             Relevant Orders    Ultrasound venous reflux leg bilateral    Compression stockings         FARRUKH Smyth  12/19/2023    Thank you very much for allowing us to participate in the care of your patient. Please do not hesitate to call if there are any questions. The office number is [phone].  Sincerely,  Nathalia Mario        This document was generated utilizing voice recognition technology. An attempt at proofreading has been made to minimize errors but topographical errors may be present.

## 2023-12-19 NOTE — ASSESSMENT & PLAN NOTE
Patient is a 38-year-old male who presents the office for evaluation of his lower extremities.  Patient works as a , has been working for the last 10 years and stands for prolonged hours.  Patient states over the last 5 years, he has had increased throbbing pain and itchiness to his lower extremities, left worse than right.  He states after a rigorous workout, he will report heaviness to his lower extremities.  He has varicose veins noted to the left medial thigh and behind the left knee.  He has tried wearing compression socks however not consistently.  He denies history of DVT or PE.  He is a non-smoker and nondiabetic.  He has not had vein procedures before.  He does not presently have any wounds.  Patient to have a formal venous reflux study to assess his superficial and deep venous system for reflux. Patient will continue using compression stockings at this time, patient encouraged to be measured for a pair of knee-high medical grade compression stockings 20 to 30 mmHg.. Patient will return to the office after having the study for evaluation to determine if there are any treatment options needed.

## 2023-12-26 NOTE — PROGRESS NOTES
[facility]  [address]  [place], [address]  (P)[phone] (F) [phone]       DETAILS OF VISIT   Visit Date: 12/28/2023  Follow-up (F/U W/ VENOUS REFLUX STUDY )      Minor Petersen presents to the office today to review venous reflux study performed today.  Patient was last seen in the office for evaluation of his lower extremities.  Patient works as a , has been working for the last 10 years and stands for prolonged hours.  Patient states over the last 5 years, he has had increased throbbing pain and itchiness to his lower extremities, left worse than right.  He states after a rigorous workout, he will report heaviness to his lower extremities.  He has varicose veins noted to the left medial thigh and behind the left knee.  He has tried wearing compression socks however not consistently.  He denies history of DVT or PE.  He is a non-smoker and nondiabetic.  He has not had vein procedures before.  He does not presently have any wounds.  Past medical history of hernia repair in 2015.  He has palpable distal pulses bilaterally.     MEDICATIONS     •  albuterol HFA  •  albuterol HFA  •  benzocaine-menthoL  •  fluticasone propionate  •  ibuprofen  •  predniSONE  •  predniSONE    PAST MEDICAL AND SURGICAL HISTORY     No past medical history on file.  No past surgical history on file.  No family history on file.    ALLERGIES     Patient has no known allergies.    SOCIAL/TOBACCO HISTORY     Social History     Socioeconomic History   • Marital status: Single   Tobacco Use   • Smoking status: Never   • Smokeless tobacco: Never     Social Determinants of Health     Food Insecurity: No Food Insecurity (1/7/2023)    Hunger Vital Sign    • Worried About Running Out of Food in the Last Year: Never true    • Ran Out of Food in the Last Year: Never true     Social History     Tobacco Use   Smoking Status Never   Smokeless Tobacco Never       REVIEW OF SYSTEMS      Constitutional: No fever, no chills, and no recent weight change. No headache.  Cardiovascular: No chest pain or discomfort, no palpitations.  Respiratory: No wheezing. No shortness of breath, no cough, no dyspnea.  Gastrointestinal: Appetite without significant change. No dysphagia, no active abdominal pain, and no melena. No bright red blood per rectum.  Integumentary: No obvious masses, No pruritus. No skin lesions and no rash  Musculoskeletal: No new muscle tenderness.  Neurological: No new motor disturbances, or sensory disturbances.   Psychological: Appropriate.    PHYSICAL EXAM     Vitals: There were no vitals taken for this visit.    Constitutional: alert, appears stated age and cooperative  Neurologic: No confusion was observed. Oriented to time,place and person. No disorientation was observed. Normal speech, motor,balance, and gait and stance.   Musculoskeletal: symmetric, no curvature. ROM normal. No CVA tenderness.  Cardiovascular: No chest pain or discomfort, no palpitations. No cold feet, or claudication.  Respiratory: Clear to auscultation bilaterally,chest expansion symmetric,  eupnea, no adventitious sounds (rales, crackles, wheezes) no wheezing, no rhonki  Extremities:           IMAGING     I have reviewed the pertinent patient's imaging results.           Venous reflux study:                         Vascular Findings    Right Lower Venous Right common femoral vein, saphenofemoral junction, great saphenous vein, deep femoral vein, proximal femoral vein, mid femoral vein, distal femoral vein, popliteal vein, small saphenous, post tibial vein and peroneal vein normally compressible and demonstrates normal spontaneous, phasic flow with normal augmentation.   Left Lower Venous Left posterior tibial vein is not well visualized.   Left peroneal vein is not well visualized.   Left common femoral vein, saphenofemoral junction, great saphenous vein, deep femoral vein, proximal femoral vein, mid femoral  vein, distal femoral vein, popiteal vein and small saphenous vein normally compressible and demonstrates normal spontaneous, phasic flow with normal augmentation.   Right Venous Reflux The exam was performed and the measurements were taken with the patient in a position of reverse trendelenburg. The right p calf trib  GSV tributary reflux time is 0.00 sec.   Left Venous Reflux The exam was performed and the measurements were taken with the patient in a position of reverse trendelenburg.     Right Deep Venous Reflux Measurements    Deep Venous Reflux Time (sec)   Common Femoral 0.94 sec          Proximal Femoral 0.00 sec          Mid Femoral 0.00 sec          Distal Femoral 0.00 sec          Popliteal 0.00 sec             Right Superficial Venous Reflux Measurements    Superficial Venous AP (cm) Reflux Time (sec)   GSV at SFJ 0.75 cm        0.60 sec          GSV 2 cm Distal to SFJ 0.60 cm        0.00 sec          GSV Proximal Thigh 0.38 cm        0.00 sec          GSV at Knee 0.41 cm        0.00 sec          GSV Proximal Calf 0.33 cm        0.00 sec          SSV Proximal Calf 0.30 cm        0.00 sec          SSV Mid Calf 0.22 cm        0.00 sec                GSV Tributaries AP (cm) Reflux Time (sec)   p calf trib        0.16 cm        0.00 sec                Left Deep Venous Reflux Measurements    Deep Venous Reflux Time (sec)   Common Femoral 0.00 sec          Proximal Femoral 0.00 sec          Mid Femoral 0.00 sec          Distal Femoral 0.00 sec          Popliteal 0.00 sec             Left Superficial Venous Measurements    Superficial Venous AP (cm) Reflux Time (sec)   GSV at SFJ 0.94 cm        0.00 sec          GSV 2 cm Distal to SFJ 0.38 cm        0.00 sec          GSV Proximal Thigh 0.46 cm        0.00 sec          GSV at Knee 0.40 cm        0.00 sec          GSV Proximal Calf 0.45 cm        0.00 sec          SSV Proximal Calf 0.38 cm        0.00 sec          SSV Mid Calf 0.33 cm        0.00 sec                  ASSESSMENT/PLAN     Problem List Items Addressed This Visit        Circulatory    Venous insufficiency - Primary    Current Assessment & Plan     Patient is a 38-year-old male who presents to the office to review venous reflux study performed today.  Patient was last seen in the office for evaluation of his lower extremities.  Patient works as a , has been working for the last 10 years and stands for prolonged hours.  Patient states over the last 5 years, he has had increased throbbing pain and itchiness to his lower extremities, left worse than right.  He states after a rigorous workout, he will report heaviness to his lower extremities.  He has varicose veins noted to the left medial thigh and behind the left knee.  He has tried wearing compression socks however not consistently.  He denies history of DVT or PE.  He is a non-smoker and nondiabetic.  He has not had vein procedures before.  He does not presently have any wounds.  Patient had formal venous reflux study performed today, which does not show any significant superficial venous reflux that would warrant a vein procedure at this time. Patient verbalized understanding, patient states he plans on focusing on weight loss to potentially help alleviate pain to his lower extremities. Patient to follow-up as needed in the future.                I spent 30 minutes on this date of service performing the following activities: obtaining history, performing examination, entering orders, documenting, preparing for visit, obtaining / reviewing records, providing counseling and education, independently reviewing study/studies, communicating results and coordinating care.     02108: 15-29  43785: 30-44  69316: 45-59    55885: 10-19  71770: 20-29  68376: 30-39        FARRUKH Smyth  12/28/2023    Thank you very much for allowing us to participate in the care of your patient. Please do not hesitate to call if there are any questions. The office  number is [phone].     Sincerely,  Nathalia Mario        This document was generated utilizing voice recognition technology. An attempt at proofreading has been made to minimize errors but topographical errors may be present.

## 2023-12-28 ENCOUNTER — OFFICE VISIT (OUTPATIENT)
Dept: VASCULAR SURGERY | Facility: CLINIC | Age: 38
End: 2023-12-28
Payer: COMMERCIAL

## 2023-12-28 ENCOUNTER — HOSPITAL ENCOUNTER (OUTPATIENT)
Dept: CARDIOLOGY | Facility: HOSPITAL | Age: 38
Discharge: HOME | End: 2023-12-28
Payer: COMMERCIAL

## 2023-12-28 VITALS — HEIGHT: 75 IN | WEIGHT: 250 LBS | BODY MASS INDEX: 31.08 KG/M2

## 2023-12-28 DIAGNOSIS — I87.2 VENOUS INSUFFICIENCY: ICD-10-CM

## 2023-12-28 DIAGNOSIS — I87.2 VENOUS INSUFFICIENCY: Primary | ICD-10-CM

## 2023-12-28 PROCEDURE — 93970 EXTREMITY STUDY: CPT | Mod: 26 | Performed by: SURGERY

## 2023-12-28 PROCEDURE — 93970 EXTREMITY STUDY: CPT

## 2023-12-28 PROCEDURE — 99214 OFFICE O/P EST MOD 30 MIN: CPT

## 2023-12-28 NOTE — ASSESSMENT & PLAN NOTE
Patient is a 38-year-old male who presents to the office to review venous reflux study performed today.  Patient was last seen in the office for evaluation of his lower extremities.  Patient works as a , has been working for the last 10 years and stands for prolonged hours.  Patient states over the last 5 years, he has had increased throbbing pain and itchiness to his lower extremities, left worse than right.  He states after a rigorous workout, he will report heaviness to his lower extremities.  He has varicose veins noted to the left medial thigh and behind the left knee.  He has tried wearing compression socks however not consistently.  He denies history of DVT or PE.  He is a non-smoker and nondiabetic.  He has not had vein procedures before.  He does not presently have any wounds.  Patient had formal venous reflux study performed today, which does not show any significant superficial venous reflux that would warrant a vein procedure at this time. Patient verbalized understanding, patient states he plans on focusing on weight loss to potentially help alleviate pain to his lower extremities. Patient to follow-up as needed in the future.

## 2023-12-29 LAB
BSA FOR ECHO PROCEDURE: 2.45 M2
LT CFV REFLUX: 0 SEC
LT GSV 2CM DISTAL TO SFJ AP: 0.38 CM
LT GSV 2CM DISTAL TO SFJ REFLUX: 0 SEC
LT GSV AT SFJ AP: 0.94 CM
LT GSV AT SFJ REFLUX: 0 SEC
LT GSV KNEE AP: 0.4 CM
LT GSV KNEE REFLUX: 0 SEC
LT GSV PROX CALF AP: 0.45 CM
LT GSV PROX CALF REFLUX: 0 SEC
LT GSV PROX THIGH AP: 0.46 CM
LT GSV PROX THIGH REFLUX: 0 SEC
LT POPLITEAL REFLUX: 0 SEC
LT SFV DISTAL REFLUX: 0 SEC
LT SFV MID REFLUX: 0 SEC
LT SFV PROX REFLUX: 0 SEC
LT SSV MID AP: 0.33 CM
LT SSV MID REFLUX: 0 SEC
LT SSV PROX AP: 0.38 CM
LT SSV PROX REFLUX: 0 SEC
RT CFV REFLUX: 0.94 SEC
RT GSV 2CM DISTAL TO SFJ AP: 0.6 CM
RT GSV 2CM DISTAL TO SFJ REFLUX: 0 SEC
RT GSV AT SFJ AP: 0.75 CM
RT GSV AT SFJ REFLUX: 0.6 SEC
RT GSV KNEE AP: 0.41 CM
RT GSV KNEE REFLUX: 0 SEC
RT GSV PROX CALF AP: 0.33 CM
RT GSV PROX CALF REFLUX: 0 SEC
RT GSV PROX THIGH AP: 0.38 CM
RT GSV PROX THIGH REFLUX: 0 SEC
RT GSV TRIBUTARY 1 AP: 0.16 CM
RT GSV TRIBUTARY 1 REFLUX: 0 SEC
RT GSV TRIBUTARY 1 VESSEL: NORMAL
RT POPLITEAL REFLUX: 0 SEC
RT SFV DISTAL REFLUX: 0 SEC
RT SFV MID REFLUX: 0 SEC
RT SFV PROX REFLUX: 0 SEC
RT SSV MID AP: 0.22 CM
RT SSV MID REFLUX: 0 SEC
RT SSV PROX AP: 0.3 CM
RT SSV PROX REFLUX: 0 SEC

## 2024-02-12 ENCOUNTER — HOSPITAL ENCOUNTER (EMERGENCY)
Facility: HOSPITAL | Age: 39
Discharge: HOME | End: 2024-02-13
Attending: EMERGENCY MEDICINE
Payer: COMMERCIAL

## 2024-02-12 DIAGNOSIS — J06.9 VIRAL URI: ICD-10-CM

## 2024-02-12 DIAGNOSIS — H10.32 ACUTE BACTERIAL CONJUNCTIVITIS OF LEFT EYE: Primary | ICD-10-CM

## 2024-02-12 PROCEDURE — 87637 SARSCOV2&INF A&B&RSV AMP PRB: CPT | Performed by: EMERGENCY MEDICINE

## 2024-02-12 PROCEDURE — 96372 THER/PROPH/DIAG INJ SC/IM: CPT

## 2024-02-12 PROCEDURE — 99283 EMERGENCY DEPT VISIT LOW MDM: CPT

## 2024-02-12 PROCEDURE — 87637 SARSCOV2&INF A&B&RSV AMP PRB: CPT

## 2024-02-12 PROCEDURE — 3E0233Z INTRODUCTION OF ANTI-INFLAMMATORY INTO MUSCLE, PERCUTANEOUS APPROACH: ICD-10-PCS | Performed by: EMERGENCY MEDICINE

## 2024-02-12 NOTE — Clinical Note
Minor Petersen was seen and treated in our emergency department on 2/12/2024.  Minor Petersen may return to work on 02/15/2024.       If you have any questions or concerns, please don't hesitate to call.      Valeria Cabrera PA C

## 2024-02-13 VITALS
RESPIRATION RATE: 16 BRPM | DIASTOLIC BLOOD PRESSURE: 85 MMHG | BODY MASS INDEX: 31.08 KG/M2 | HEIGHT: 75 IN | HEART RATE: 86 BPM | TEMPERATURE: 98.5 F | SYSTOLIC BLOOD PRESSURE: 148 MMHG | OXYGEN SATURATION: 97 % | WEIGHT: 250 LBS

## 2024-02-13 LAB — S PYO DNA THROAT QL NAA+PROBE: NOT DETECTED

## 2024-02-13 PROCEDURE — 63600000 HC DRUGS/DETAIL CODE

## 2024-02-13 PROCEDURE — 87651 STREP A DNA AMP PROBE: CPT

## 2024-02-13 PROCEDURE — 63700000 HC SELF-ADMINISTRABLE DRUG

## 2024-02-13 RX ORDER — KETOROLAC TROMETHAMINE 15 MG/ML
15 INJECTION, SOLUTION INTRAMUSCULAR; INTRAVENOUS ONCE
Status: COMPLETED | OUTPATIENT
Start: 2024-02-13 | End: 2024-02-13

## 2024-02-13 RX ORDER — POLYMYXIN B SULFATE AND TRIMETHOPRIM 1; 10000 MG/ML; [USP'U]/ML
1 SOLUTION OPHTHALMIC
Qty: 10 ML | Refills: 0 | Status: SHIPPED | OUTPATIENT
Start: 2024-02-13 | End: 2024-02-13

## 2024-02-13 RX ORDER — POLYMYXIN B SULFATE AND TRIMETHOPRIM 1; 10000 MG/ML; [USP'U]/ML
1 SOLUTION OPHTHALMIC
Qty: 10 ML | Refills: 0 | Status: SHIPPED | OUTPATIENT
Start: 2024-02-13 | End: 2024-02-20

## 2024-02-13 RX ORDER — POLYMYXIN B SULFATE AND TRIMETHOPRIM 1; 10000 MG/ML; [USP'U]/ML
1 SOLUTION OPHTHALMIC ONCE
Status: COMPLETED | OUTPATIENT
Start: 2024-02-13 | End: 2024-02-13

## 2024-02-13 RX ORDER — ACETAMINOPHEN 325 MG/1
975 TABLET ORAL ONCE
Status: COMPLETED | OUTPATIENT
Start: 2024-02-13 | End: 2024-02-13

## 2024-02-13 RX ORDER — BENZONATATE 100 MG/1
100 CAPSULE ORAL ONCE
Status: COMPLETED | OUTPATIENT
Start: 2024-02-13 | End: 2024-02-13

## 2024-02-13 RX ADMIN — ACETAMINOPHEN 975 MG: 325 TABLET ORAL at 01:08

## 2024-02-13 RX ADMIN — KETOROLAC TROMETHAMINE 15 MG: 15 INJECTION, SOLUTION INTRAMUSCULAR; INTRAVENOUS at 01:07

## 2024-02-13 RX ADMIN — BENZONATATE 100 MG: 100 CAPSULE ORAL at 01:08

## 2024-02-13 RX ADMIN — POLYMYXIN B SULFATE AND TRIMETHOPRIM 1 DROP: 10000; 1 SOLUTION OPHTHALMIC at 01:08

## 2024-02-13 NOTE — DISCHARGE INSTRUCTIONS
You are being prescribed antibiotic eyedrops for your eye infection.  Take these as prescribed.  Do not stop taking them early even if your symptoms improve.    Your strep test is pending in the ED today.  If the results come back positive, you will receive a phone call from the hospital with a prescription of antibiotics sent to your pharmacy.    You can manage your fevers/pain with over-the-counter medications.You can take acetaminophen (Tylenol) 500-1000 mg for breakthrough pain/fevers. Please be aware your total acetaminophen dosages with all products combined; no more than 1300 mg every 8 hours or 4000 mg daily. Do not take with liver diease.     You can take also anti-inflammatories such as ibuprofen (Advil) 400-600 mg every 6-8 hours or naproxen/naproxen sodium (Aleve) 500/550 mg every 12 hours. Do not take both of these medications together. Do not take if you are on a blood thinner or have been advised to avoid NSAIDs for heart, kidney, or gastric ulcers.     You were given both of the above medications in the ED therefore do not re-dose yourself until the appropriate time after. You were given these medicatons prior to discharge.     You can obtain over-the-counter combination medications including cold and cough medications, cold and flu medications, DayQuil/NyQuil, Wanda-[place] plus, TheraFlu to help manage your symptoms. DayQuil and NyQuil combination are very good to use with your current symptomatology as they contain 4 combination medications that assist with multimodal symptom management including congestion, coughing, mucus, pain/fever. Whichever over-the-counter cold and flu medication you obtain, please make sure you are watching your acetaminophen containing product dosage. Acetaminophen is Tylenol. You can take 1300 mg every 8 hours or a maximum of 4 g daily. Please make sure you are not exceeding these dosages with your combination medications or supplemental acetaminophen use as they can be  harmful to your liver.     Cepacol or Chloraseptic spray can further be obtained over-the-counter which will help with a sore throat, pain from repeated coughing.    Please consult a local pharmacist if you take many over-the-counter medications to make sure no interactions exist.     Other supportive care at home that can assist with your symptoms include; hot tea with honey, cough drops, constant hydration, gentle nutrition, adequate sleep and rest. If your symptoms are not improving over the course of the next week or began to worsen significantly, please return to the ED.     Return precautions include fevers that are not decreased following both acetaminophen and ibuprofen, vomiting with the inability to keep food down, chest pain, difficulty breathing, worsening of symptoms after improvement.

## 2024-02-13 NOTE — ED ATTESTATION NOTE
The patient was evaluated and managed by the physician assistant / nurse practitioner.       Abdon Esparza MD  02/13/24 4902

## 2024-02-13 NOTE — ED PROVIDER NOTES
Emergency Medicine Note  HPI   HISTORY OF PRESENT ILLNESS     Minor Petersen is a 38 y.o. male with PMH of venous insufficiency who presents to the ED today via POV for evaluation of left eye discharge since today.  Per patient, over the course of the past 2 days has been presenting with some nonspecific upper respiratory symptoms including increased congestion, rhinorrhea, sore throat, intermittent productive cough alongside generalized bodyaches.  He reports that he went to a party 3 days previous and thinks he may have caught a bug there.  States he was doing okay until he woke up this morning and noted that he was having some discharge from his left lateral eye.  He reports the discharge has been yellow-green and puslike and is continuous throughout the course of the day.  Did wake up with his eyes crusted shut but has been unable to get the discharge to stop.  Does not wear any contacts.  Denies any trauma or injury.  Denies any vision changes.. Patient specifically denies any fevers, chills, headaches, dizziness, nausea, vomiting, abdominal pain, urine or bowel changes, palpitations, chest pain, difficulty breathing.        Patient History   PAST HISTORY     Reviewed from Nursing Triage:       No past medical history on file.    No past surgical history on file.    No family history on file.    Social History     Tobacco Use   • Smoking status: Never   • Smokeless tobacco: Never         Review of Systems   REVIEW OF SYSTEMS     See HPI above.        VITALS     ED Vitals    Date/Time Temp Pulse Resp BP SpO2 [facility]   02/13/24 0029 -- 86 16 148/85 97 % NORMA   02/12/24 2116 36.9 °C (98.5 °F) 73 18 147/80 97 % IAT                       Physical Exam   PHYSICAL EXAM     PHYSICAL EXAM  GEN: well appearing, well nourished, pleasant male in no acute distress, positioned seated upright on the stretcher    HEENT: atraumatic, normocephalic, nonicteric sclera. EOMI without nystagmus, PERRL.  Left eye with conjunctival injection  and puslike yellow-green discharge noted from the medial and lateral canthus.  No evidence of surrounding erythema, edema, proptosis.  No overlying skin changes or concern for orbital or preseptal cellulitis.  Tonsils are 2+ and erythematous without any exudates.  Posterior oropharyngeal erythema and postnasal drip noted.  No facial tenderness to palpation.  NECK: soft, supple  CV: normal rate and rhythm, no murmurs, rubs, or gallops noted   PULM: normal effort and breathing, no accessory muscle use, CTAB without adventitious breath sounds   MSK: no cyanosis, clubbing, or edema noted. Peripheral pulses equal and symmetric at radial   SKIN: warm, dry, and well perfused, no decreased skin turgor, capillary refills <3 secs  NEURO: Alert, awake, answering questions appropriately   PSYCH: normal mood and affect        PROCEDURES     Procedures     DATA     Results     Procedure Component Value Units Date/Time    Group A Strep by PCR, Throat Throat Swab [[id number]] Collected: 02/13/24 0029    Specimen: Throat Swab Updated: 02/13/24 0045    SARS-COV-2 (COVID-19)/ FLU A/B, AND RSV, PCR Nasopharynx [[id number]]  (Normal) Collected: 02/12/24 2119    Specimen: Nasopharyngeal Swab from Nasopharynx Updated: 02/12/24 2236     SARS-CoV-2 (COVID-19) Negative     Influenza A Negative     Influenza B Negative     Respiratory Syncytial Virus Negative    Narrative:      Testing performed using real-time PCR for detection of COVID-19. EUA approved validation studies performed on site.           Imaging Results    None         No orders to display       Scoring tools                                  ED Course & MDM   MDM / ED COURSE / CLINICAL IMPRESSION / DISPO     Medical Decision Making  38 y.o. male with past medical history as above presenting with left eye discharge since today alongside some nonspecific upper respiratory symptoms ongoing over the course of the past 2 days    Based on history and presentation, differentials include  viral illness, COVID, flu, RSV, strep pharyngitis, bacterial conjunctivitis.    Will obtain viral swabs alongside strep swabs in the ED today.     Will provide the patient with antibiotic eyedrops, symptom management and reassess periodically.     Patient with acute bacterial conjunctivitis on physical examination.  Will provide Polytrim in the ED today alongside outpatient prescription for further treatment.  In regards to upper respiratory symptoms, have been ongoing for 2 days and are mild currently.  COVID, flu, RSV results currently negative.  Strep pharyngitis additionally ordered given significant sore throat with some mild erythema although no exudates are noted.  Low suspicion for pharyngitis but hard to exclude resistant strep species.  Swabs ordered and pending at the time of discharge.    Supportive care discussed with patient in detail at home for upper respiratory symptoms which she is amenable to.  Return precautions discussed.  Patient be discharged.    The patient expressed understanding of and agreement with all items discussed and further progression of care plan.    Amount and/or Complexity of Data Reviewed  Labs: ordered. Decision-making details documented in ED Course.      Risk  OTC drugs.  Prescription drug management.          ED Course as of 02/13/24 0120   Tue Feb 13, 2024   0019 SARS-COV-2 (COVID-19)/ FLU A/B, AND RSV, PCR Nasopharynx  Negative [DJ]   0120 Group A Strep by PCR, Throat Throat Swab  Negative [DJ]      ED Course User Index  [DJ] Valeria Cabrera PA C     Clinical Impression      Acute bacterial conjunctivitis of left eye   Viral URI     _________________     ED Disposition   Discharge                   Valeria Cabrera PA C  02/13/24 0102

## 2024-05-20 ENCOUNTER — OFFICE VISIT (OUTPATIENT)
Dept: SURGERY | Facility: CLINIC | Age: 39
End: 2024-05-20
Payer: COMMERCIAL

## 2024-05-20 VITALS
SYSTOLIC BLOOD PRESSURE: 126 MMHG | BODY MASS INDEX: 31.7 KG/M2 | HEIGHT: 74 IN | WEIGHT: 247 LBS | DIASTOLIC BLOOD PRESSURE: 85 MMHG | HEART RATE: 88 BPM

## 2024-05-20 DIAGNOSIS — K64.5 EXTERNAL THROMBOSED HEMORRHOIDS: ICD-10-CM

## 2024-05-20 DIAGNOSIS — M62.838 LEVATOR SPASM: Primary | ICD-10-CM

## 2024-05-20 PROCEDURE — 46600 DIAGNOSTIC ANOSCOPY SPX: CPT | Performed by: COLON & RECTAL SURGERY

## 2024-05-20 PROCEDURE — 99203 OFFICE O/P NEW LOW 30 MIN: CPT | Mod: 25 | Performed by: COLON & RECTAL SURGERY

## 2024-05-20 PROCEDURE — 3008F BODY MASS INDEX DOCD: CPT | Performed by: COLON & RECTAL SURGERY

## 2024-05-20 NOTE — PROGRESS NOTES
"Colon and Rectal Surgery Consult    Subjective     Minor Petersen is a 38 y.o. male who comes in for evaluation    After a bowel movement, he noticed some bulging from his rectum about a year ago. Patient was seen by a medical provider in February/March 2024 and did rectal exam and referred him here--he thinks was told he has hemorrhoids. . He then noted some rectal discomfort in early May.  He describes it as intermittent, sharp pain with some lingering of dull aching pain. No blood in stool.  No blood on toilet tissue. He has regular BM, 1x/day, soft and fomred.  No blood in the stool.     He has never had a colonoscopy.    No family history of colon cancer.  Surgical history includes hiatal hernia repair 2015.      Medical History: History reviewed. No pertinent past medical history.    Surgical History: History reviewed. No pertinent surgical history.    Social History:   Social History     Tobacco Use    Smoking status: Never    Smokeless tobacco: Never       Family History: History reviewed. No pertinent family history.    Allergies: Patient has no known allergies.    Current Medications:    albuterol HFA    diazepam    albuterol HFA    benzocaine-menthoL    fluticasone propionate    ibuprofen    predniSONE    predniSONE    Review of Systems  Review of Systems   All other systems reviewed and are negative.      Objective     Physicial Exam  Visit Vitals  /85 (BP Location: Left upper arm, Patient Position: Sitting)   Pulse 88   Ht 1.88 m (6' 2\")   Wt 112 kg (247 lb)   BMI 31.71 kg/m²       Physical Exam  Vitals reviewed. Exam conducted with a chaperone present.   Constitutional:       Appearance: Normal appearance.   HENT:      Head: Normocephalic and atraumatic.   Eyes:      Pupils: Pupils are equal, round, and reactive to light.   Cardiovascular:      Rate and Rhythm: Normal rate and regular rhythm.   Pulmonary:      Effort: Pulmonary effort is normal.      Breath sounds: Normal breath sounds. "   Abdominal:      General: Bowel sounds are normal.   Genitourinary:     Comments: No sign of anal fissure on careful skin effacement.  There was a very small thrombosed hemorrhoid in the right lateral vein.  There is slight bulging in the right posterior position with straining.  Digital exam the anus is normal except for some spasticity of the left levator muscle and pressing on this area seems to reproduce his discomfort.  The right side feels normal.  Anoscopy is normal without any internal hemorrhoids.  Musculoskeletal:         General: Normal range of motion.      Cervical back: Neck supple.   Skin:     General: Skin is warm and dry.   Neurological:      Mental Status: He is alert and oriented to person, place, and time.   Psychiatric:         Mood and Affect: Mood normal.         Behavior: Behavior normal.         Assessment     Problem List Items Addressed This Visit          Circulatory    External thrombosed hemorrhoids       Musculoskeletal    Levator spasm - Primary    Current Assessment & Plan     Although his symptoms are reminiscent of anal fissure, I was unable to find anything on exam.  He has tenderness in the left levator muscles.  I think he generally feels well during the day and then at the end of the day he is getting muscle spasms.  I am not sure it is directly related to bowel function.  We discussed physical therapy versus muscle relaxants.  I like to try him on a course of Valium suppositories first.  We discussed using this at night first so he does not get daytime drowsiness.  He may try twice a day on weekends.         Relevant Medications    diazepam (VALIUM) compounded suppository           Guille Swain MD

## 2024-05-20 NOTE — PATIENT INSTRUCTIONS
External (outside) hemorrhoids form near the anus and are covered by sensitive skin. They are usually painless unless a blood clot (thrombosis) forms.  Thrombosed external hemorrhoids are blood clots that form in an outer hemorrhoid in the anal skin. If the clots are large, they can cause significant pain. A painful anal mass may appear suddenly and get worse during the first 48 hours. The pain generally lessens over the next few days. You may notice bleeding if the skin on top opens.    The main risk factor for development of thrombosis is pressure in the area which causes decreased blood flow and then development of a clot.     Constipation with straining   Weight lifting   Pregnancy -during pregnancy or secondary to delivery   Prolonged sitting on the toilet (including reading)   Frequent use of the toilet secondary to diarrhea    Sometimes I am not able to find a specific cause.    If I see a patient with a thrombosed hemorrhoid within the first 2-3 days after the start of symptoms, and the pain is still quite severe, a thrombectomy under local anesthesia may be done.    If it is after that time, and the symptoms are starting to improve, I generally let the hemorrhoid resolve on its own.  The pain usually resolves completely by 1 week.  The blood will get reabsorbed and the swelling resolves over 3-4 weeks.  There is usually no long-lasting sequela after this.    Topical creams do not help with the symptoms or hasten recovery.  In the early time after developing symptoms, ice packs tend to help.    I may recommend follow-up after 1 month to do an anorectal examination once the hemorrhoid has resolved.

## 2024-05-20 NOTE — LETTER
May 21, 2024     Rasta Sahni MD  [address] [place] [id number]    Patient: Minor Petersen  YOB: 1985  Date of Visit: 5/20/2024      Dear Dr. Sahni:    Thank you for referring Minor Petersen to me for evaluation. Below are my notes for this consultation.    If you have questions, please do not hesitate to call me. I look forward to following your patient along with you.         Sincerely,        Guille Swain MD        CC: No Recipients    Guille Swain MD  5/21/2024  2:03 PM  Sign when Signing Visit  Colon and Rectal Surgery Consult    Subjective    Minor Petersen is a 38 y.o. male who comes in for evaluation    After a bowel movement, he noticed some bulging from his rectum about a year ago. Patient was seen by a medical provider in February/March 2024 and did rectal exam and referred him here--he thinks was told he has hemorrhoids. . He then noted some rectal discomfort in early May.  He describes it as intermittent, sharp pain with some lingering of dull aching pain. No blood in stool.  No blood on toilet tissue. He has regular BM, 1x/day, soft and fomred.  No blood in the stool.     He has never had a colonoscopy.    No family history of colon cancer.  Surgical history includes hiatal hernia repair 2015.      Medical History: History reviewed. No pertinent past medical history.    Surgical History: History reviewed. No pertinent surgical history.    Social History:   Social History     Tobacco Use   • Smoking status: Never   • Smokeless tobacco: Never       Family History: History reviewed. No pertinent family history.    Allergies: Patient has no known allergies.    Current Medications:  •  albuterol HFA  •  diazepam  •  albuterol HFA  •  benzocaine-menthoL  •  fluticasone propionate  •  ibuprofen  •  predniSONE  •  predniSONE    Review of Systems  Review of Systems   All other systems reviewed and are negative.      Objective    Physicial Exam  Visit Vitals  /85 (BP  "Location: Left upper arm, Patient Position: Sitting)   Pulse 88   Ht 1.88 m (6' 2\")   Wt 112 kg (247 lb)   BMI 31.71 kg/m²       Physical Exam  Vitals reviewed. Exam conducted with a chaperone present.   Constitutional:       Appearance: Normal appearance.   HENT:      Head: Normocephalic and atraumatic.   Eyes:      Pupils: Pupils are equal, round, and reactive to light.   Cardiovascular:      Rate and Rhythm: Normal rate and regular rhythm.   Pulmonary:      Effort: Pulmonary effort is normal.      Breath sounds: Normal breath sounds.   Abdominal:      General: Bowel sounds are normal.   Genitourinary:     Comments: No sign of anal fissure on careful skin effacement.  There was a very small thrombosed hemorrhoid in the right lateral vein.  There is slight bulging in the right posterior position with straining.  Digital exam the anus is normal except for some spasticity of the left levator muscle and pressing on this area seems to reproduce his discomfort.  The right side feels normal.  Anoscopy is normal without any internal hemorrhoids.  Musculoskeletal:         General: Normal range of motion.      Cervical back: Neck supple.   Skin:     General: Skin is warm and dry.   Neurological:      Mental Status: He is alert and oriented to person, place, and time.   Psychiatric:         Mood and Affect: Mood normal.         Behavior: Behavior normal.         Assessment    Problem List Items Addressed This Visit          Circulatory    External thrombosed hemorrhoids       Musculoskeletal    Levator spasm - Primary    Current Assessment & Plan     Although his symptoms are reminiscent of anal fissure, I was unable to find anything on exam.  He has tenderness in the left levator muscles.  I think he generally feels well during the day and then at the end of the day he is getting muscle spasms.  I am not sure it is directly related to bowel function.  We discussed physical therapy versus muscle relaxants.  I like to try him " on a course of Valium suppositories first.  We discussed using this at night first so he does not get daytime drowsiness.  He may try twice a day on weekends.         Relevant Medications    diazepam (VALIUM) compounded suppository           Guille Swain MD

## 2024-05-21 NOTE — ASSESSMENT & PLAN NOTE
Although his symptoms are reminiscent of anal fissure, I was unable to find anything on exam.  He has tenderness in the left levator muscles.  I think he generally feels well during the day and then at the end of the day he is getting muscle spasms.  I am not sure it is directly related to bowel function.  We discussed physical therapy versus muscle relaxants.  I like to try him on a course of Valium suppositories first.  We discussed using this at night first so he does not get daytime drowsiness.  He may try twice a day on weekends.

## 2024-07-22 ENCOUNTER — HOSPITAL ENCOUNTER (EMERGENCY)
Facility: HOSPITAL | Age: 39
Discharge: HOME | End: 2024-07-22
Attending: EMERGENCY MEDICINE
Payer: COMMERCIAL

## 2024-07-22 VITALS
HEART RATE: 78 BPM | OXYGEN SATURATION: 99 % | DIASTOLIC BLOOD PRESSURE: 98 MMHG | SYSTOLIC BLOOD PRESSURE: 138 MMHG | RESPIRATION RATE: 16 BRPM | TEMPERATURE: 98 F

## 2024-07-22 DIAGNOSIS — J02.0 STREP PHARYNGITIS: Primary | ICD-10-CM

## 2024-07-22 DIAGNOSIS — K21.9 GASTROESOPHAGEAL REFLUX DISEASE WITHOUT ESOPHAGITIS: ICD-10-CM

## 2024-07-22 LAB
FLUAV RNA SPEC QL NAA+PROBE: NEGATIVE
FLUBV RNA SPEC QL NAA+PROBE: NEGATIVE
RSV RNA SPEC QL NAA+PROBE: NEGATIVE
S PYO DNA THROAT QL NAA+PROBE: DETECTED
SARS-COV-2 RNA RESP QL NAA+PROBE: NEGATIVE

## 2024-07-22 PROCEDURE — 87651 STREP A DNA AMP PROBE: CPT | Performed by: EMERGENCY MEDICINE

## 2024-07-22 PROCEDURE — 87637 SARSCOV2&INF A&B&RSV AMP PRB: CPT | Performed by: EMERGENCY MEDICINE

## 2024-07-22 PROCEDURE — 63700000 HC SELF-ADMINISTRABLE DRUG: Performed by: PHYSICIAN ASSISTANT

## 2024-07-22 PROCEDURE — 87637 SARSCOV2&INF A&B&RSV AMP PRB: CPT

## 2024-07-22 PROCEDURE — 63600000 HC DRUGS/DETAIL CODE: Performed by: PHYSICIAN ASSISTANT

## 2024-07-22 PROCEDURE — 87651 STREP A DNA AMP PROBE: CPT

## 2024-07-22 PROCEDURE — 99283 EMERGENCY DEPT VISIT LOW MDM: CPT | Mod: 25

## 2024-07-22 RX ORDER — AMOXICILLIN 500 MG/1
500 CAPSULE ORAL 3 TIMES DAILY
Qty: 21 CAPSULE | Refills: 0 | Status: SHIPPED | OUTPATIENT
Start: 2024-07-22 | End: 2024-07-29

## 2024-07-22 RX ORDER — ACETAMINOPHEN 325 MG/1
975 TABLET ORAL ONCE
Status: COMPLETED | OUTPATIENT
Start: 2024-07-22 | End: 2024-07-22

## 2024-07-22 RX ORDER — ACETAMINOPHEN 500 MG
1000 TABLET ORAL EVERY 6 HOURS PRN
Qty: 50 TABLET | Refills: 0 | Status: SHIPPED | OUTPATIENT
Start: 2024-07-22

## 2024-07-22 RX ORDER — PANTOPRAZOLE SODIUM 20 MG/1
20 TABLET, DELAYED RELEASE ORAL 2 TIMES DAILY
Qty: 28 TABLET | Refills: 0 | Status: SHIPPED | OUTPATIENT
Start: 2024-07-22 | End: 2024-08-05

## 2024-07-22 RX ORDER — AMOXICILLIN 250 MG/1
500 CAPSULE ORAL ONCE
Status: COMPLETED | OUTPATIENT
Start: 2024-07-22 | End: 2024-07-22

## 2024-07-22 RX ORDER — PANTOPRAZOLE SODIUM 40 MG/1
40 TABLET, DELAYED RELEASE ORAL ONCE
Status: COMPLETED | OUTPATIENT
Start: 2024-07-22 | End: 2024-07-22

## 2024-07-22 RX ORDER — DEXAMETHASONE 4 MG/1
10 TABLET ORAL ONCE
Status: COMPLETED | OUTPATIENT
Start: 2024-07-22 | End: 2024-07-22

## 2024-07-22 RX ADMIN — DEXAMETHASONE 10 MG: 4 TABLET ORAL at 23:27

## 2024-07-22 RX ADMIN — PANTOPRAZOLE SODIUM 40 MG: 40 TABLET, DELAYED RELEASE ORAL at 23:28

## 2024-07-22 RX ADMIN — AMOXICILLIN 500 MG: 250 CAPSULE ORAL at 23:27

## 2024-07-22 RX ADMIN — ACETAMINOPHEN 975 MG: 325 TABLET ORAL at 23:28

## 2024-07-23 ASSESSMENT — ENCOUNTER SYMPTOMS
NAUSEA: 0
COUGH: 1
VOMITING: 0
FATIGUE: 1
CHEST TIGHTNESS: 0
BACK PAIN: 0
CHILLS: 1
AGITATION: 0
DIAPHORESIS: 1
FEVER: 1
DIARRHEA: 0
NECK PAIN: 0
SHORTNESS OF BREATH: 0
RHINORRHEA: 0
SORE THROAT: 1
SEIZURES: 0
COLOR CHANGE: 0
ABDOMINAL PAIN: 0
ACTIVITY CHANGE: 0
WEAKNESS: 0
DIFFICULTY URINATING: 0
SPEECH DIFFICULTY: 0
HEADACHES: 0

## 2024-07-23 NOTE — ED PROVIDER NOTES
Emergency Medicine Note  HPI   HISTORY OF PRESENT ILLNESS     This is a 38-year-old man with a history of a hiatal hernia with acid reflux presenting to the ER complaining of a sore throat.  Reports that symptoms started about 4 days ago and have been progressively worsening, associated with subjective fevers and chills, difficulty swallowing.  He also notes he has had a persistent cough for months that is worse when he lays flat.  He has no other complaints or concerns.          Patient History   PAST HISTORY     Reviewed from Nursing Triage:       No past medical history on file.    No past surgical history on file.    No family history on file.    Social History     Tobacco Use    Smoking status: Never    Smokeless tobacco: Never         Review of Systems   REVIEW OF SYSTEMS     Review of Systems   Constitutional:  Positive for chills, diaphoresis, fatigue and fever. Negative for activity change.   HENT:  Positive for sore throat. Negative for rhinorrhea.    Respiratory:  Positive for cough. Negative for chest tightness and shortness of breath.    Cardiovascular:  Negative for chest pain and leg swelling.   Gastrointestinal:  Negative for abdominal pain, diarrhea, nausea and vomiting.   Genitourinary:  Negative for difficulty urinating.   Musculoskeletal:  Negative for back pain and neck pain.   Skin:  Negative for color change.   Neurological:  Negative for seizures, syncope, speech difficulty, weakness and headaches.   Psychiatric/Behavioral:  Negative for agitation and behavioral problems.          VITALS     ED Vitals      Date/Time Temp Pulse Resp BP SpO2 [facility]   07/22/24 2155 36.7 °C (98 °F) 78 16 138/98 99 % AA                         Physical Exam   PHYSICAL EXAM     Physical Exam  Vitals and nursing note reviewed.   Constitutional:       Appearance: He is well-developed.   HENT:      Head: Normocephalic and atraumatic.      Mouth/Throat:      Pharynx: Posterior oropharyngeal erythema present. No  oropharyngeal exudate.   Eyes:      Conjunctiva/sclera: Conjunctivae normal.   Cardiovascular:      Rate and Rhythm: Normal rate and regular rhythm.   Pulmonary:      Effort: Pulmonary effort is normal.      Breath sounds: Normal breath sounds.   Abdominal:      General: There is no distension.      Palpations: Abdomen is soft. There is no mass.      Tenderness: There is no abdominal tenderness.   Musculoskeletal:         General: No tenderness or deformity. Normal range of motion.      Cervical back: Normal range of motion.   Skin:     General: Skin is warm and dry.   Neurological:      Mental Status: He is alert. Mental status is at baseline.   Psychiatric:         Behavior: Behavior normal.           PROCEDURES     Procedures     DATA     Results       Procedure Component Value Units Date/Time    SARS-COV-2 (COVID-19)/ FLU A/B, AND RSV, PCR Nasopharynx [[id number]]  (Normal) Collected: 07/22/24 2200    Specimen: Nasopharyngeal Swab from Nasopharynx Updated: 07/22/24 2300     SARS-CoV-2 (COVID-19) Negative     Influenza A Negative     Influenza B Negative     Respiratory Syncytial Virus Negative    Narrative:      Testing performed using real-time PCR for detection of COVID-19. EUA approved validation studies performed on site.     Group A Strep by PCR, Throat Throat Swab [[id number]]  (Abnormal) Collected: 07/22/24 2200    Specimen: Throat Swab Updated: 07/22/24 2249     Strep A PCR, Throat Detected            Imaging Results    None         No orders to display       Scoring tools                                  ED Course & MDM   MDM / ED COURSE / CLINICAL IMPRESSION / DISPO     Medical Decision Making  Patient presents with a sore throat with positive rapid strep test as detailed above.  Will cover with amoxicillin and antipyretics for symptom control.  Additionally he reports many months of a persistent cough that seems to be very likely related to GERD.  He has not taken any medication for GERD since his  hiatal hernia was treated surgically many years ago.  I recommended that he resume a course of a PPI and follow-up with a GI doctor.    Problems Addressed:  Gastroesophageal reflux disease without esophagitis: acute illness or injury  Strep pharyngitis: acute illness or injury    Amount and/or Complexity of Data Reviewed  Labs: ordered.    Risk  OTC drugs.  Prescription drug management.           Clinical Impression      Strep pharyngitis   Gastroesophageal reflux disease without esophagitis     _________________       ED Disposition   Discharge                       Michael Dugan PA C  07/23/24 0528

## 2024-07-25 NOTE — ED ATTESTATION NOTE
The patient was evaluated and managed by the physician assistant.  I agree with the PA/NP’s history, physical, assessment, and plan of care, with the following exceptions: None      Mckayla Jensen MD  07/24/24 3376

## 2025-03-07 NOTE — ED ATTESTATION NOTE
I have personally seen and examined Minor Petersen.  I personally performed the key components of the encounter and provided a substantive portion of the care and medical decision making for this patient.    I reviewed and agree with physician assistant / nurse practitioner’s assessment and plan of care, with any exceptions as documented below.      My focused history, examination, assessment, and plan of care of Minor Petersen is as follows:    Brief History:  HPI  .  37-year-old male with history of asthma complaining of cough x4 days.  He has body aches and congestion.  No fever.  No shortness of breath.      Focused Physical Exam:  Physical Exam    No acute distress.  Clear lungs  Regular rate and rhythm  Clear speech  No edema    Assessment / Plan / MDM:  MDM    This x-ray and viral swab are stable.  No sign of respiratory distress.  Do not suspect pneumonia or PE.    Clinical impression: Upper respiratory infection    I was physically present for the key/critical portions of the following procedures:  Procedures           Abdon Esparza MD  01/07/23 5368     Show Aperture Variable?: Yes Application Tool (Optional): Liquid Nitrogen Sprayer Consent: The patient's consent was obtained including but not limited to risks of crusting, scabbing, blistering, scarring, darker or lighter pigmentary change, recurrence, incomplete removal and infection. Duration Of Freeze Thaw-Cycle (Seconds): 5 Detail Level: Detailed Number Of Freeze-Thaw Cycles: 1 freeze-thaw cycle Post-Care Instructions: I reviewed with the patient in detail post-care instructions. Patient is to wear sunprotection, and avoid picking at any of the treated lesions. Pt may apply Vaseline to crusted or scabbing areas. Render Note In Bullet Format When Appropriate: No

## 2025-08-02 ENCOUNTER — HOSPITAL ENCOUNTER (EMERGENCY)
Facility: HOSPITAL | Age: 40
Discharge: HOME | End: 2025-08-02
Attending: EMERGENCY MEDICINE
Payer: COMMERCIAL

## 2025-08-02 VITALS
TEMPERATURE: 98.5 F | RESPIRATION RATE: 16 BRPM | DIASTOLIC BLOOD PRESSURE: 88 MMHG | OXYGEN SATURATION: 97 % | SYSTOLIC BLOOD PRESSURE: 132 MMHG | HEART RATE: 83 BPM

## 2025-08-02 DIAGNOSIS — B34.9 VIRAL SYNDROME: Primary | ICD-10-CM

## 2025-08-02 PROCEDURE — 87637 SARSCOV2&INF A&B&RSV AMP PRB: CPT

## 2025-08-02 PROCEDURE — 87637 SARSCOV2&INF A&B&RSV AMP PRB: CPT | Performed by: EMERGENCY MEDICINE

## 2025-08-02 PROCEDURE — 99283 EMERGENCY DEPT VISIT LOW MDM: CPT

## 2025-08-02 ASSESSMENT — ENCOUNTER SYMPTOMS
MYALGIAS: 1
COUGH: 0
FLU SYMPTOMS: 1
CHILLS: 1
ABDOMINAL PAIN: 0

## 2025-08-02 NOTE — ED PROVIDER NOTES
Emergency Medicine Note  HPI   HISTORY OF PRESENT ILLNESS     Mr. Petersen is a 40 yo M who presents with chills and body aches since yesterday.  Coworker was sick 2 days ago.  Pt denies congestion, cough, abd pain, dysuria, rash or other symptoms.      Flu Symptoms  Presenting symptoms: myalgias    Presenting symptoms: no cough    Associated symptoms: chills    Associated symptoms: no congestion          Patient History   PAST HISTORY     Reviewed from Nursing Triage:       No past medical history on file.    No past surgical history on file.    No family history on file.    Social History     Tobacco Use    Smoking status: Never    Smokeless tobacco: Never         Review of Systems   REVIEW OF SYSTEMS     Review of Systems   Constitutional:  Positive for chills.   HENT:  Negative for congestion.    Respiratory:  Negative for cough.    Gastrointestinal:  Negative for abdominal pain.   Musculoskeletal:  Positive for myalgias.         VITALS     ED Vitals      Date/Time Temp Pulse Resp BP SpO2 [facility]   08/02/25 1001 36.9 °C (98.5 °F) -- -- -- -- EE   08/02/25 0959 -- 91 16 148/90 96 % EE                         Physical Exam   PHYSICAL EXAM     Physical Exam  Vitals and nursing note reviewed.   Constitutional:       Appearance: Normal appearance.   HENT:      Head: Normocephalic and atraumatic.   Cardiovascular:      Rate and Rhythm: Normal rate and regular rhythm.   Pulmonary:      Effort: Pulmonary effort is normal.      Breath sounds: Normal breath sounds.   Abdominal:      Palpations: Abdomen is soft.      Tenderness: There is no abdominal tenderness. There is no guarding or rebound.   Skin:     General: Skin is warm and dry.   Neurological:      General: No focal deficit present.      Mental Status: He is alert.   Psychiatric:         Mood and Affect: Mood normal.         Behavior: Behavior normal.           PROCEDURES     Procedures     DATA     Results       Procedure Component Value Units Date/Time    SARS-COV-2  (COVID-19)/ FLU A/B, AND RSV, PCR Nasopharynx [[id number]]  (Normal) Collected: 08/02/25 1001    Specimen: Nasopharyngeal Swab from Nasopharynx Updated: 08/02/25 1139     SARS-CoV-2 (COVID-19) Negative     Influenza A Negative     Influenza B Negative     Respiratory Syncytial Virus Negative            Imaging Results    None         No orders to display       Scoring tools                                  ED Course & MDM   MDM / ED COURSE / CLINICAL IMPRESSION / DISPO     Medical Decision Making  Amount and/or Complexity of Data Reviewed  Labs: ordered.      Pt presents with viral symptoms.    Viral swab negative.    Discussed expected course, will provide work note.     Clinical Impression      Viral syndrome     _________________       ED Disposition   Discharge                       Hamilton Bhatia MD  08/02/25 4781

## 2025-08-02 NOTE — Clinical Note
Minor Petersen was seen and treated in our emergency department on 8/2/2025.  Minor Petersen may return to work on 08/06/2025.       If you have any questions or concerns, please don't hesitate to call.      Hamilton Bhatia MD